# Patient Record
Sex: FEMALE | Race: WHITE
[De-identification: names, ages, dates, MRNs, and addresses within clinical notes are randomized per-mention and may not be internally consistent; named-entity substitution may affect disease eponyms.]

---

## 2017-01-18 ENCOUNTER — HOSPITAL ENCOUNTER (EMERGENCY)
Age: 67
Discharge: HOME | End: 2017-01-18
Payer: MEDICARE

## 2017-01-18 DIAGNOSIS — Z87.19: ICD-10-CM

## 2017-01-18 DIAGNOSIS — N18.9: ICD-10-CM

## 2017-01-18 DIAGNOSIS — D64.9: Primary | ICD-10-CM

## 2017-01-18 DIAGNOSIS — Z79.4: ICD-10-CM

## 2017-01-18 DIAGNOSIS — E78.00: ICD-10-CM

## 2017-01-18 DIAGNOSIS — E11.22: ICD-10-CM

## 2017-01-18 DIAGNOSIS — Z99.2: ICD-10-CM

## 2017-01-18 DIAGNOSIS — K92.2: ICD-10-CM

## 2017-01-18 PROCEDURE — 86900 BLOOD TYPING SEROLOGIC ABO: CPT

## 2017-01-18 PROCEDURE — 36415 COLL VENOUS BLD VENIPUNCTURE: CPT

## 2017-01-18 PROCEDURE — 96374 THER/PROPH/DIAG INJ IV PUSH: CPT

## 2017-01-18 PROCEDURE — 86901 BLOOD TYPING SEROLOGIC RH(D): CPT

## 2017-01-18 PROCEDURE — 99284 EMERGENCY DEPT VISIT MOD MDM: CPT

## 2017-01-18 PROCEDURE — 80048 BASIC METABOLIC PNL TOTAL CA: CPT

## 2017-01-18 PROCEDURE — 99283 EMERGENCY DEPT VISIT LOW MDM: CPT

## 2017-01-18 PROCEDURE — 85025 COMPLETE CBC W/AUTO DIFF WBC: CPT

## 2017-01-18 PROCEDURE — 85730 THROMBOPLASTIN TIME PARTIAL: CPT

## 2017-01-18 PROCEDURE — 86850 RBC ANTIBODY SCREEN: CPT

## 2017-01-18 PROCEDURE — 85610 PROTHROMBIN TIME: CPT

## 2017-01-18 PROCEDURE — 36430 TRANSFUSION BLD/BLD COMPNT: CPT

## 2017-01-18 PROCEDURE — 86920 COMPATIBILITY TEST SPIN: CPT

## 2017-01-24 ENCOUNTER — HOSPITAL ENCOUNTER (EMERGENCY)
Age: 67
Discharge: HOME | End: 2017-01-24
Payer: MEDICARE

## 2017-01-24 DIAGNOSIS — E11.22: ICD-10-CM

## 2017-01-24 DIAGNOSIS — Z99.2: ICD-10-CM

## 2017-01-24 DIAGNOSIS — J18.9: Primary | ICD-10-CM

## 2017-01-24 DIAGNOSIS — Z79.4: ICD-10-CM

## 2017-01-24 DIAGNOSIS — N18.6: ICD-10-CM

## 2017-01-24 DIAGNOSIS — R07.89: ICD-10-CM

## 2017-01-24 PROCEDURE — 93010 ELECTROCARDIOGRAM REPORT: CPT

## 2017-01-24 PROCEDURE — 80048 BASIC METABOLIC PNL TOTAL CA: CPT

## 2017-01-24 PROCEDURE — 71250 CT THORAX DX C-: CPT

## 2017-01-24 PROCEDURE — 99283 EMERGENCY DEPT VISIT LOW MDM: CPT

## 2017-01-24 PROCEDURE — 85025 COMPLETE CBC W/AUTO DIFF WBC: CPT

## 2017-01-24 PROCEDURE — 93005 ELECTROCARDIOGRAM TRACING: CPT

## 2017-01-24 PROCEDURE — 71020: CPT

## 2017-01-24 PROCEDURE — 99284 EMERGENCY DEPT VISIT MOD MDM: CPT

## 2017-01-24 PROCEDURE — 36415 COLL VENOUS BLD VENIPUNCTURE: CPT

## 2017-04-10 ENCOUNTER — HOSPITAL ENCOUNTER (OUTPATIENT)
Age: 67
Discharge: HOME | End: 2017-04-10
Payer: MEDICARE

## 2017-04-10 DIAGNOSIS — R78.81: Primary | ICD-10-CM

## 2017-04-15 ENCOUNTER — HOSPITAL ENCOUNTER (OUTPATIENT)
Age: 67
Discharge: ANOTHER HOSPITAL | End: 2017-04-15
Payer: MEDICARE

## 2017-04-15 ENCOUNTER — HOSPITAL ENCOUNTER (EMERGENCY)
Dept: HOSPITAL 76 - ED | Age: 67
Discharge: HOME | End: 2017-04-15
Payer: MEDICARE

## 2017-04-15 VITALS — SYSTOLIC BLOOD PRESSURE: 159 MMHG | DIASTOLIC BLOOD PRESSURE: 79 MMHG

## 2017-04-15 DIAGNOSIS — R53.1: Primary | ICD-10-CM

## 2017-04-15 DIAGNOSIS — J81.0: ICD-10-CM

## 2017-04-15 DIAGNOSIS — E87.5: Primary | ICD-10-CM

## 2017-04-15 DIAGNOSIS — E11.9: ICD-10-CM

## 2017-04-15 DIAGNOSIS — R53.83: ICD-10-CM

## 2017-04-15 DIAGNOSIS — E78.00: ICD-10-CM

## 2017-04-15 LAB
ALBUMIN/GLOB SERPL: 1.5 {RATIO} (ref 1–2.2)
ANION GAP SERPL CALCULATED.4IONS-SCNC: 16 MMOL/L (ref 6–13)
BASOPHILS NFR BLD AUTO: 0.1 10^3/UL (ref 0–0.1)
BASOPHILS NFR BLD AUTO: 0.8 %
BILIRUB BLD-MCNC: 0.6 MG/DL (ref 0.2–1)
BUN SERPL-MCNC: 84 MG/DL (ref 6–20)
CALCIUM UR-MCNC: 7.5 MG/DL (ref 8.5–10.3)
CHLORIDE SERPL-SCNC: 90 MMOL/L (ref 101–111)
CO2 SERPL-SCNC: 24 MMOL/L (ref 21–32)
CREAT SERPLBLD-SCNC: 9.6 MG/DL (ref 0.4–1)
EOSINOPHIL # BLD AUTO: 0.1 10^3/UL (ref 0–0.7)
EOSINOPHIL NFR BLD AUTO: 0.7 %
ERYTHROCYTE [DISTWIDTH] IN BLOOD BY AUTOMATED COUNT: 16 % (ref 12–15)
GFRSERPLBLD MDRD-ARVRAT: 4 ML/MIN/{1.73_M2} (ref 89–?)
GLOBULIN SER-MCNC: 3 G/DL (ref 2.1–4.2)
GLUCOSE SERPL-MCNC: 227 MG/DL (ref 70–100)
HCT VFR BLD AUTO: 26.3 % (ref 37–47)
HGB UR QL STRIP: 8.9 G/DL (ref 12–16)
LIPASE SERPL-CCNC: 57 U/L (ref 22–51)
LYMPHOCYTES # SPEC AUTO: 1.2 10^3/UL (ref 1.5–3.5)
LYMPHOCYTES NFR BLD AUTO: 13.2 %
MAGNESIUM SERPL-MCNC: 3 MG/DL (ref 1.7–2.8)
MCH RBC QN AUTO: 29.3 PG (ref 27–31)
MCHC RBC AUTO-ENTMCNC: 34 G/DL (ref 32–36)
MCV RBC AUTO: 86.1 FL (ref 81–99)
MONOCYTES # BLD AUTO: 0.6 10^3/UL (ref 0–1)
MONOCYTES NFR BLD AUTO: 6.9 %
NEUTROPHILS # BLD AUTO: 7.3 10^3/UL (ref 1.5–6.6)
NEUTROPHILS # SNV AUTO: 9.4 X10^3/UL (ref 4.8–10.8)
NEUTROPHILS NFR BLD AUTO: 78.4 %
NRBC # BLD AUTO: 0.2 /100WBC
PDW BLD AUTO: 7.3 FL (ref 7.9–10.8)
PHOSPHATE BLD-MCNC: 4.6 MG/DL (ref 2.5–4.6)
POTASSIUM SERPL-SCNC: 6.1 MMOL/L (ref 3.5–5)
PROT SPEC-MCNC: 7.4 G/DL (ref 6.7–8.2)
RBC MAR: 3.05 10^6/UL (ref 4.2–5.4)
SODIUM SERPLBLD-SCNC: 130 MMOL/L (ref 135–145)
WBC # BLD: 9.4 X10^3/UL

## 2017-04-15 PROCEDURE — 80053 COMPREHEN METABOLIC PANEL: CPT

## 2017-04-15 PROCEDURE — 71010: CPT

## 2017-04-15 PROCEDURE — 83735 ASSAY OF MAGNESIUM: CPT

## 2017-04-15 PROCEDURE — 84100 ASSAY OF PHOSPHORUS: CPT

## 2017-04-15 PROCEDURE — 83690 ASSAY OF LIPASE: CPT

## 2017-04-15 PROCEDURE — 36415 COLL VENOUS BLD VENIPUNCTURE: CPT

## 2017-04-15 PROCEDURE — 99284 EMERGENCY DEPT VISIT MOD MDM: CPT

## 2017-04-15 PROCEDURE — 93010 ELECTROCARDIOGRAM REPORT: CPT

## 2017-04-15 PROCEDURE — 85025 COMPLETE CBC W/AUTO DIFF WBC: CPT

## 2017-04-15 PROCEDURE — 84484 ASSAY OF TROPONIN QUANT: CPT

## 2017-04-15 PROCEDURE — 93005 ELECTROCARDIOGRAM TRACING: CPT

## 2017-04-15 RX ADMIN — INSULIN HUMAN STA UNIT: 100 INJECTION, SOLUTION PARENTERAL at 18:32

## 2017-04-15 RX ADMIN — SODIUM CHLORIDE ONE: 9 INJECTION, SOLUTION INTRAVENOUS at 17:40

## 2017-04-15 RX ADMIN — SODIUM POLYSTYRENE SULFONATE STA GM: 15 SUSPENSION ORAL; RECTAL at 20:04

## 2017-04-15 NOTE — XRAY PRELIMINARY REPORT
Accession: [id number]

Exam: XR Chest 1 View

 

IMPRESSION: New cardiomegaly and pulmonary vascular congestion, suggesting CHF.

 

[facility]

## 2017-04-15 NOTE — XRAY REPORT
EXAM: 

CHEST RADIOGRAPHY

 

EXAM DATE: 4/15/2017 07:30 PM.

 

CLINICAL HISTORY: Dyspnea and weakness.

 

COMPARISON: CT chest and chest radiograph 01/24/2017.

 

TECHNIQUE: 1 view.

 

FINDINGS:

Lungs/Pleura: New diffuse vascular engorgement. No pneumothorax or convincing pleural effusion.

 

Mediastinum: New cardiomegaly.

 

Other: Degenerative changes within the spine.

 

IMPRESSION: New cardiomegaly and pulmonary vascular congestion, suggesting CHF.

 

[facility]

Referring Provider Line: [phone]

## 2017-04-15 NOTE — ED PHYSICIAN DOCUMENTATION
History of Present Illness





- Stated complaint


Stated Complaint: weakness





- Chief complaint


Chief Complaint: Neuro





- History obtained from


History obtained from: Patient





- History of Present Illness


Timing: Today


Pain level max: 0


Pain level now: 0


Improved by: rest


Worsened by: movement





- Additonal information


Additional information: 


Patient is a 66-year-old female who presents to the emergency department after 

feeling weak all day today.  States has become progressively weak throughout 

the day.  States last time this happened her potassium was high.  She is a 

hemodialysis patient, on dialysis at home.  Sees Dr. Noriega for nephrology.  

Denies any recent illnesses or injury. She states it is been 2 days since her 

last dialysis





Review of Systems


Ten Systems: 10 systems reviewed and negative


Constitutional: denies: Fever, Chills


Nose: denies: Rhinorrhea / runny nose, Congestion


Throat: denies: Sore throat


Respiratory: denies: Cough


GI: denies: Nausea, Vomiting, Diarrhea


Skin: denies: Rash


Musculoskeletal: denies: Neck pain, Back pain


Neurologic: denies: Focal weakness, Numbness, Confused, Altered mental status, 

Headache





PD PAST MEDICAL HISTORY





- Past Medical History


Past Medical History: Yes


Cardiovascular: High cholesterol


Endocrine/Autoimmune: Type 2 diabetes


Psych: Depression, Anxiety


Other Past Medical History: Abd'l Hernia





- Past Surgical History


Past Surgical History: Yes





- Present Medications


Home Medications: 


 Ambulatory Orders











 Medication  Instructions  Recorded  Confirmed


 


Allopurinol 100 mg PO DAILY 04/12/15 04/15/17


 


Temazepam [Restoril] 7.5 mg PO DAILY PRN 04/12/15 04/15/17


 


buPROPion [Wellbutrin Xl] 300 mg PO DAILY 04/12/15 04/15/17


 


predniSONE [Deltasone] 2.5 mg PO BID 04/12/15 04/15/17


 


Citalopram Hydrobromide 40 mg PO DAILY 12/04/15 04/15/17





[Citalopram HBr]   


 


Furosemide 160 mg PO BID 12/04/15 04/15/17


 


Sevelamer Carbonate [Renvela] 3 tab PO TID 12/04/15 04/15/17


 


Calcitriol [Rocaltrol] 0.5 mcg PO DAILY 08/16/16 04/15/17


 


Calcium Carbonate 1,500 mg PO DAILY 08/16/16 04/15/17


 


Cinacalcet HCl [Sensipar] 60 mg PO DAILY 08/16/16 04/15/17


 


Levothyroxine Sodium [Unithroid] 150 mcg PO DAILY 08/16/16 04/15/17


 


Omeprazole 40 mg PO DAILY 08/16/16 04/15/17


 


Rosuvastatin Calcium [Crestor] 20 mg PO DAILY 08/16/16 04/15/17


 


Solifenacin Succinate [Vesicare] 5 mg PO DAILY 08/16/16 04/15/17


 


Azithromycin [Zithromax] 250 mg PO DAILY #6 tablet 01/24/17 04/15/17


 


Heparin Sodium,Porcine/Pf [Heparin 1,600 units IV DAILY 01/24/17 04/15/17





1,000 Unit/10 (100/ml)]   














- Allergies


Allergies/Adverse Reactions: 


 Allergies











Allergy/AdvReac Type Severity Reaction Status Date / Time


 


promethazine HCl * AdvReac  Hallucinati Verified 04/15/17 17:14





[From Phenergan]   ons  














- Social History


Does the pt smoke?: No


Smoking Status: Never smoker


Does the pt drink ETOH?: Yes


ETOH Use: Liquor


Does the pt have substance abuse?: No





- Immunizations


Immunizations are current?: Yes





- POLST


Patient has POLST: No





PD ED PE NORMAL





- Vitals


Vital signs reviewed: Yes





- General


General: Alert and oriented X 3, No acute distress, Well developed/nourished





- HEENT


HEENT: Moist mucous membranes





- Neck


Neck: Supple, no meningeal sign





- Cardiac


Cardiac: RRR, Strong equal pulses





- Respiratory


Respiratory: No respiratory distress, Clear bilaterally





- Abdomen


Abdomen: Soft, Non tender, Non distended





- Derm


Derm: Warm and dry, No rash





- Extremities


Extremities: Other (LUE fistula with palpable thrill)





- Neuro


Neuro: Alert and oriented X 3





- Psych


Psych: Normal mood, Normal affect





Results





- Vitals


Vitals: 


 Vital Signs - 24 hr











  04/15/17 04/15/17 04/15/17





  17:02 17:40 18:03


 


Temperature 37.1 C  


 


Heart Rate 96 96 92


 


Respiratory 19 20 19





Rate   


 


Blood Pressure 156/87 H 149/85 H 149/85 H


 


O2 Saturation 98 100 99














  04/15/17





  19:54


 


Temperature 


 


Heart Rate 92


 


Respiratory 12





Rate 


 


Blood Pressure 159/79 H


 


O2 Saturation 100








 Oxygen











O2 Source                      Room air

















- EKG (time done)


  ** 1811


Rate: Rate (enter#) (90)


Rhythm: NSR


Axis: Normal


Intervals: Prolonged DE


QRS: Normal


Ischemia: Other (minimal ST elevation II, aVF)





- Labs


Labs: 


 Laboratory Tests











  04/15/17 04/15/17 04/15/17





  17:28 17:28 17:28


 


WBC  9.4  


 


RBC  3.05 L  


 


Hgb  8.9 L  


 


Hct  26.3 L  


 


MCV  86.1  


 


MCH  29.3  


 


MCHC  34.0  


 


RDW  16.0 H  


 


Plt Count  304  


 


MPV  7.3 L  


 


Neut #  7.3 H  


 


Lymph #  1.2 L  


 


Mono #  0.6  


 


Eos #  0.1  


 


Baso #  0.1  


 


Absolute Nucleated RBC  0.02  


 


Nucleated RBCs  0.2  


 


Sodium   130 L 


 


Potassium   6.1 H* 


 


Chloride   90 L 


 


Carbon Dioxide   24 


 


Anion Gap   16.0 H 


 


BUN   84 H* 


 


Creatinine   9.6 H* 


 


Estimated GFR (MDRD)   4 L 


 


Glucose   227 H 


 


Calcium   7.5 L 


 


Phosphorus   4.6 


 


Magnesium   3.0 H 


 


Total Bilirubin   0.6 


 


AST   30 


 


ALT   27 


 


Alkaline Phosphatase   63 


 


Troponin I    0.05


 


Total Protein   7.4 


 


Albumin   4.4 


 


Globulin   3.0 


 


Albumin/Globulin Ratio   1.5 


 


Lipase   57 H 














- Rads (name of study)


  ** cxr


Radiology: Prelim report reviewed, EMP read contemporaneously, See rad report (

New cardiomegaly and pulmonary vascular congestion, suggesting CHF)





PD MEDICAL DECISION MAKING





- ED course


Complexity details: reviewed old records, reviewed results, re-evaluated patient

, considered differential, d/w patient, d/w consultant (Dr. Noriega - Recommends d/

c home, kayexalate 30gm PO, and dialysis when she returns home.)


ED course: 





Patient presents to the emergency department with a mild hyperkalemia.  No EKG 

changes.  Given insulin here as she does have high blood sugar.  She is able to 

do her dialysis at home tonight, will have her do this.  Discussed with Dr. Noriega

, who will follow up with her.  This should also help the fluid overload.  She 

is on Lasix at home and does still make urine.  She is otherwise asymptomatic 

here.  No hypoxia.  No respiratory distress.  No chest pain.  Patient counseled 

regarding signs and symptoms for which I believe and urgent re-evaluation would 

be necessary. Patient with good understanding of and agreement to plan and is 

comfortable going home at this time





This document was made in part using voice recognition software. While efforts 

are made to proofread this document, sound alike and grammatical errors may 

occur.





Departure





- Departure


Disposition: 01 Home, Self Care


Clinical Impression: 


 Hyperkalemia





Pulmonary edema


Qualifiers:


 Chronicity: acute Qualified Code(s): J81.0 - Acute pulmonary edema


Condition: Good


Instructions:  ED Potassium Excess


Follow-Up: 


Homer Borja MD [Primary Care Provider] - Within 3 Days


Comments: 


Return if you worsen.  I spoke with Dr. Hari mcclendon and he says you need to go 

home and do your dialysis.  Return here if you fail to improve. You should have 

your potassium rechecked in 3 days with your doctor


Discharge Date/Time: 04/15/17 20:32

## 2017-04-18 ENCOUNTER — HOSPITAL ENCOUNTER (OUTPATIENT)
Age: 67
End: 2017-04-18
Payer: MEDICARE

## 2017-04-18 DIAGNOSIS — R78.81: Primary | ICD-10-CM

## 2017-04-24 ENCOUNTER — HOSPITAL ENCOUNTER (OUTPATIENT)
Age: 67
Discharge: HOME | End: 2017-04-24
Payer: MEDICARE

## 2017-04-24 DIAGNOSIS — G47.33: Primary | ICD-10-CM

## 2017-04-24 PROCEDURE — 99203 OFFICE O/P NEW LOW 30 MIN: CPT

## 2017-05-08 ENCOUNTER — HOSPITAL ENCOUNTER (OUTPATIENT)
Age: 67
Discharge: HOME | End: 2017-05-08
Payer: MEDICARE

## 2017-05-08 DIAGNOSIS — R78.81: Primary | ICD-10-CM

## 2017-05-13 ENCOUNTER — HOSPITAL ENCOUNTER (OUTPATIENT)
Age: 67
Discharge: HOME | End: 2017-05-13
Payer: MEDICARE

## 2017-05-13 DIAGNOSIS — Z53.9: Primary | ICD-10-CM

## 2017-05-20 ENCOUNTER — HOSPITAL ENCOUNTER (OUTPATIENT)
Dept: HOSPITAL 76 - ED | Age: 67
Setting detail: OBSERVATION
Discharge: HOME | End: 2017-05-20
Attending: INTERNAL MEDICINE | Admitting: INTERNAL MEDICINE
Payer: MEDICARE

## 2017-05-20 VITALS — SYSTOLIC BLOOD PRESSURE: 165 MMHG | DIASTOLIC BLOOD PRESSURE: 70 MMHG

## 2017-05-20 DIAGNOSIS — D50.0: Primary | ICD-10-CM

## 2017-05-20 DIAGNOSIS — N18.6: ICD-10-CM

## 2017-05-20 DIAGNOSIS — E66.9: ICD-10-CM

## 2017-05-20 DIAGNOSIS — I12.0: ICD-10-CM

## 2017-05-20 DIAGNOSIS — Z79.52: ICD-10-CM

## 2017-05-20 DIAGNOSIS — T86.12: ICD-10-CM

## 2017-05-20 DIAGNOSIS — E11.22: ICD-10-CM

## 2017-05-20 DIAGNOSIS — Z99.2: ICD-10-CM

## 2017-05-20 DIAGNOSIS — E78.5: ICD-10-CM

## 2017-05-20 DIAGNOSIS — K92.2: ICD-10-CM

## 2017-05-20 DIAGNOSIS — Z79.899: ICD-10-CM

## 2017-05-20 LAB
ANION GAP SERPL CALCULATED.4IONS-SCNC: 17 MMOL/L (ref 6–13)
BASOPHILS NFR BLD AUTO: 0.1 10^3/UL (ref 0–0.1)
BASOPHILS NFR BLD AUTO: 0.9 %
BUN SERPL-MCNC: 83 MG/DL (ref 6–20)
CALCIUM UR-MCNC: 9.2 MG/DL (ref 8.5–10.3)
CHLORIDE SERPL-SCNC: 95 MMOL/L (ref 101–111)
CO2 SERPL-SCNC: 23 MMOL/L (ref 21–32)
CREAT SERPLBLD-SCNC: 10.4 MG/DL (ref 0.4–1)
EOSINOPHIL # BLD AUTO: 0.2 10^3/UL (ref 0–0.7)
EOSINOPHIL NFR BLD AUTO: 2.3 %
ERYTHROCYTE [DISTWIDTH] IN BLOOD BY AUTOMATED COUNT: 15.5 % (ref 12–15)
EST. AVERAGE GLUCOSE BLD GHB EST-MCNC: 103 MG/DL (ref 70–100)
GFRSERPLBLD MDRD-ARVRAT: 4 ML/MIN/{1.73_M2} (ref 89–?)
GLUCOSE SERPL-MCNC: 124 MG/DL (ref 70–100)
HBA1C BLD-MCNC: 0.31 G/DL
HCT VFR BLD AUTO: 23.4 % (ref 37–47)
HGB UR QL STRIP: 8.4 G/DL (ref 12–16)
LYMPHOCYTES # SPEC AUTO: 0.9 10^3/UL (ref 1.5–3.5)
LYMPHOCYTES NFR BLD AUTO: 9.2 %
MAGNESIUM SERPL-MCNC: 2.5 MG/DL (ref 1.7–2.8)
MCH RBC QN AUTO: 31 PG (ref 27–31)
MCHC RBC AUTO-ENTMCNC: 36.1 G/DL (ref 32–36)
MCV RBC AUTO: 85.8 FL (ref 81–99)
MONOCYTES # BLD AUTO: 1 10^3/UL (ref 0–1)
MONOCYTES NFR BLD AUTO: 10.6 %
NEUTROPHILS # BLD AUTO: 7.5 10^3/UL (ref 1.5–6.6)
NEUTROPHILS # SNV AUTO: 9.7 X10^3/UL (ref 4.8–10.8)
NEUTROPHILS NFR BLD AUTO: 77 %
NRBC # BLD AUTO: 0.3 /100WBC
PDW BLD AUTO: 7.8 FL (ref 7.9–10.8)
PHOSPHATE BLD-MCNC: 6.4 MG/DL (ref 2.5–4.6)
POTASSIUM SERPL-SCNC: 4 MMOL/L (ref 3.5–5)
RBC MAR: 2.73 10^6/UL (ref 4.2–5.4)
SODIUM SERPLBLD-SCNC: 135 MMOL/L (ref 135–145)
WBC # BLD: 9.7 X10^3/UL

## 2017-05-20 PROCEDURE — 86901 BLOOD TYPING SEROLOGIC RH(D): CPT

## 2017-05-20 PROCEDURE — 99283 EMERGENCY DEPT VISIT LOW MDM: CPT

## 2017-05-20 PROCEDURE — 36430 TRANSFUSION BLD/BLD COMPNT: CPT

## 2017-05-20 PROCEDURE — 80048 BASIC METABOLIC PNL TOTAL CA: CPT

## 2017-05-20 PROCEDURE — 36415 COLL VENOUS BLD VENIPUNCTURE: CPT

## 2017-05-20 PROCEDURE — 93005 ELECTROCARDIOGRAM TRACING: CPT

## 2017-05-20 PROCEDURE — 99284 EMERGENCY DEPT VISIT MOD MDM: CPT

## 2017-05-20 PROCEDURE — 82310 ASSAY OF CALCIUM: CPT

## 2017-05-20 PROCEDURE — 85014 HEMATOCRIT: CPT

## 2017-05-20 PROCEDURE — 84100 ASSAY OF PHOSPHORUS: CPT

## 2017-05-20 PROCEDURE — 86850 RBC ANTIBODY SCREEN: CPT

## 2017-05-20 PROCEDURE — 85018 HEMOGLOBIN: CPT

## 2017-05-20 PROCEDURE — 86900 BLOOD TYPING SEROLOGIC ABO: CPT

## 2017-05-20 PROCEDURE — 84484 ASSAY OF TROPONIN QUANT: CPT

## 2017-05-20 PROCEDURE — 83036 HEMOGLOBIN GLYCOSYLATED A1C: CPT

## 2017-05-20 PROCEDURE — 86920 COMPATIBILITY TEST SPIN: CPT

## 2017-05-20 PROCEDURE — 83735 ASSAY OF MAGNESIUM: CPT

## 2017-05-20 PROCEDURE — 85025 COMPLETE CBC W/AUTO DIFF WBC: CPT

## 2017-05-20 PROCEDURE — 93010 ELECTROCARDIOGRAM REPORT: CPT

## 2017-05-20 NOTE — ED PHYSICIAN DOCUMENTATION
History of Present Illness





- Stated complaint


Stated Complaint: WEAKNESS





- Chief complaint


Chief Complaint: General





- Additonal information


Additional information: 





hx from pt


66 f


ESRD


does home hemodialysis 5 d per week


her nephrologist is Dr Noriega


she has chronic GI bleeding - has been extensively worked up with EGD 

colonoscopy tagged red cell study, swallowed camera etc with no dx - beleieved 

to be slow seepage 2.2 ESRD


had a transfusion yesterday


feels generally weak today - no focal numbness or weakness


so her nephorlogist told her to come in to get checked - specifically lytes and 

HGB


denies fever chills


no HA CP AP


cough 


no NVD


stools dark could be 2/2 iron infusion


she states she is 3 kg up but does not feel she has pulm edema and declines CXR











Review of Systems


Constitutional: denies: Fever, Chills


Throat: denies: Sore throat


Cardiac: denies: Chest pain / pressure


Respiratory: reports: Cough.  denies: Dyspnea


GI: reports: Bloody / black stool (dark).  denies: Abdominal Pain, Nausea, 

Vomiting


: reports: Other (ESRD)


Neurologic: reports: Generalized weakness.  denies: Focal weakness, Numbness


Endocrine: denies: Easy bruising / bleeding


Immunocompromised: denies: Immunocompromised





PD PAST MEDICAL HISTORY





- Past Medical History


Past Medical History: Yes


Cardiovascular: High cholesterol


Endocrine/Autoimmune: Type 2 diabetes


Psych: Depression, Anxiety





- Past Surgical History


Past Surgical History: Yes





- Present Medications


Home Medications: 


 Ambulatory Orders











 Medication  Instructions  Recorded  Confirmed


 


Allopurinol 100 mg PO DAILY 04/12/15 05/19/17


 


Temazepam [Restoril] 7.5 mg PO DAILY PRN 04/12/15 05/19/17


 


buPROPion [Wellbutrin Xl] 300 mg PO DAILY 04/12/15 05/19/17


 


predniSONE [Deltasone] 2.5 mg PO BID 04/12/15 05/19/17


 


Citalopram Hydrobromide 40 mg PO DAILY 12/04/15 05/19/17





[Citalopram HBr]   


 


Furosemide 160 mg PO BID 12/04/15 05/19/17


 


Sevelamer Carbonate [Renvela] 3 tab PO TID 12/04/15 05/19/17


 


Calcitriol [Rocaltrol] 0.5 mcg PO DAILY 08/16/16 05/19/17


 


Calcium Carbonate 1,500 mg PO DAILY 08/16/16 05/19/17


 


Cinacalcet HCl [Sensipar] 60 mg PO DAILY 08/16/16 05/19/17


 


Levothyroxine Sodium [Unithroid] 150 mcg PO DAILY 08/16/16 05/19/17


 


Omeprazole 40 mg PO DAILY 08/16/16 05/19/17


 


Rosuvastatin Calcium [Crestor] 20 mg PO DAILY 08/16/16 05/19/17


 


Solifenacin Succinate [Vesicare] 5 mg PO DAILY 08/16/16 05/19/17


 


Azithromycin [Zithromax] 250 mg PO DAILY #6 tablet 01/24/17 05/19/17


 


Heparin Sodium,Porcine/Pf [Heparin 1,600 units IV DAILY 01/24/17 05/19/17





1,000 Unit/10 (100/ml)]   














- Allergies


Allergies/Adverse Reactions: 


 Allergies











Allergy/AdvReac Type Severity Reaction Status Date / Time


 


promethazine HCl * AdvReac  Hallucinati Verified 04/15/17 17:14





[From Phenergan]   ons  














- Social History


Does the pt smoke?: No


Smoking Status: Never smoker


Does the pt drink ETOH?: Yes


Does the pt have substance abuse?: No





- Immunizations


Immunizations are current?: Yes





- POLST


Patient has POLST: No





PD ED PE NORMAL





- Vitals


Vital signs reviewed: Yes





- HEENT


HEENT: Atraumatic





- Neck


Neck: Supple, no meningeal sign





- Cardiac


Cardiac: RRR.  No: No murmur (loud sys mur,urs - not new per pt, AS per chart)





- Respiratory


Respiratory: No respiratory distress, Clear bilaterally





- Abdomen


Abdomen: Soft, Non tender





- Rectal


Rectal: Other (dark strongly heme occult + stool QC passed)





- Derm


Derm: Normal color





- Extremities


Extremities: No deformity





- Neuro


Neuro: Alert and oriented X 3, CNs 2-12 intact, No motor deficit, No sensory 

deficit, Normal speech





Results





- Vitals


Vitals: 


 Vital Signs - 24 hr











  05/20/17 05/20/17 05/20/17





  08:08 09:38 10:37


 


Temperature 36.7 C  


 


Heart Rate 98 91 91


 


Respiratory 16  20





Rate   


 


Blood Pressure 164/88 H 158/88 H 144/70 H


 


O2 Saturation 95 96 95














  05/20/17





  11:25


 


Temperature 


 


Heart Rate 88


 


Respiratory 18





Rate 


 


Blood Pressure 163/77 H


 


O2 Saturation 96








 Oxygen











O2 Source                      Room air

















- EKG (time done)


  ** 0852


Rate: Rate (enter#) (92)


Rhythm: NSR


Axis: Normal


Intervals: Normal MI


QRS: Normal


Ischemia: Normal ST segments





- Labs


Labs: 


 Laboratory Tests











  05/20/17 05/20/17 05/20/17





  09:00 09:00 09:00


 


WBC  9.7  


 


RBC  2.73 L  


 


Hgb  8.4 L  


 


Hct  23.4 L  


 


MCV  85.8  


 


MCH  31.0  


 


MCHC  36.1 H  


 


RDW  15.5 H  


 


Plt Count  236  


 


MPV  7.8 L  


 


Neut #  7.5 H  


 


Lymph #  0.9 L  


 


Mono #  1.0  


 


Eos #  0.2  


 


Baso #  0.1  


 


Absolute Nucleated RBC  0.03  


 


Nucleated RBCs  0.3  


 


Sodium   135 


 


Potassium   4.0 


 


Chloride   95 L 


 


Carbon Dioxide   23 


 


Anion Gap   17.0 H 


 


BUN   83 H* 


 


Creatinine   10.4 H* 


 


Estimated GFR (MDRD)   4 L 


 


Glucose   124 H 


 


Calcium   9.2 


 


Phosphorus   6.4 H 


 


Magnesium   2.5 


 


Troponin I    0.07














PD MEDICAL DECISION MAKING





- ED course


ED course: 





discussed results with nephrology on call


he recommends pt need hgb > 9 and if still GI bleeding and < 9 transfer to [facility] 

for admit for transfusion where there is nephrology


pt however declines to be transferred to [facility] - she states she wants to be 

transfused here and then will do her own dialysis tonight at home


since will take many hours to slowly transfuse a dialysis pt while watching for 

fluid overload discussed with hospitalist Dr Zaragoza who agreed to place pt in 

obs 





Departure





- Departure


Disposition: ED Place in Observation


Clinical Impression: 


 Weakness, ESRD (end stage renal disease)





GI bleed


Qualifiers:


 GI bleed type/associated pathology: unspecified gastrointestinal hemorrhage 

type Qualified Code(s): K92.2 - Gastrointestinal hemorrhage, unspecified





Anemia


Qualifiers:


 Anemia type: unspecified type Qualified Code(s): D64.9 - Anemia, unspecified


Condition: Fair


Discharge Date/Time: 05/20/17 12:31

## 2017-05-20 NOTE — DISCHARGE PLAN
Discharge Plan


Disposition: 01 Home, Self Care


Condition: Fair


Diet: Diabetic


Activity Restrictions: Activity as Tolerated


Shower Restrictions: No


Driving Restrictions: No


Weight Bearing: Full Weight


Additional Instructions or Follow Up instructions: 


You presented to the hospital with dizziness and nausea. We found your Hb was 

only 8.4 despite you getting 2 units of blood yesterday. We placed you in 

observation to get 1 units of blood today. You received the blood and there 

were no complications so you are ready to go home. Please make sure to do 

dialysis today and I would recommend taking off a little extra fluid the next 

few days as you are a bit over your dry weight. Also please follow up for a lab 

draw on Monday to check your Hb. 


No Smoking: If you smoke, Please STOP!  Call [phone] for help.

## 2017-05-20 NOTE — HISTORY & PHYSICAL EXAMINATION
Chief Complaint





- Chief Complaint


Chief Complaint: Weak and dizzy





History of Present Illness





- Admitted From


Admitted From:: emergency department





- History Obtained From


Records Reviewed: yes


History obtained from: patient


Exam Limitations: none





- History of Present Illness


HPI Comment/Other: 





Patient is a 66-year-old female with a past medical history significant for end-

stage renal disease on home hemodialysis 5 days a week secondary to focal 

segmental glomerular nephritis diagnosed in  status post transplant in  

with failed transplant in 2013 back on dialysis, history of chronic GI bleed 

with extensive workup including EGD, colonoscopy, capsule endoscopy and RBC 

scan thought to likely be bleeding slowly from AVMs who receives blood 

transfusions off and on, diabetes, hypertension, hyperlipidemia and large 

abdominal hernia who presents to the emergency department with a chief 

complaint of dizziness and weakness. The patient was transfused 2 units of 

packed RBCs in the MAC clinic yesterday as she had a hemoglobin of 7.3 from 

last . She states that she returned home and did dialysis last night and 

when she woke up this morning she felt very dizzy and weak this is usually an 

indication to her that she needs further blood transfusion. The patient 

presents to the emergency department for a blood transfusion. Patient states 

that she has chronic shortness of breath that is not any worse than her normal. 

Patient also states she has chronic cough but is not any worse than her normal. 

She denies any chest pain. She states that her normal dry weight is 90.8 kg and 

today her weight is 93.8 kg.





On presentation to the emergency department the patient was afebrile and had 

stable vital signs her CBC revealed a hemoglobin of 8.4. The patient's 

nephrologist Dr. Benavides requested that the patient be transfused slowly. 

Initially plan was for the patient to be sent to [facility] to 

be transfused however the patient declined to be transferred. She requested 

that she get transfused 1 unit here and then she would return home to perform 

dialysis. She stated that if she needed further transfusion she would return to 

the emergency department. Patient was placed in observation on the medical lemon 

for 1 unit blood transfusion and then will be discharged home for dialysis.





Review of Systems





- Constitutional


Constitutional: reports: Fatigue, Weakness, Poor appetite, Weight gain.  denies

: Fever, Chills, Malaise, Diaphoresis, Night sweats





- Eyes


Eyes: denies: Pain, Irritation, Amaurosis, Blurred vision, Spots in vision, 

Field loss, Vision loss, Dipolpia, Corrective lenses, Other





- Ears, Nose & Throat


Ears, Nose & Throat: denies: Ear pain, Hearing loss, Hearing aids, Tinnitus, 

Vertigo, Nasal pain, Nasal discharge, Nosebleeds, Nasal obstruction, Nasal 

congestion, Postnasal drainage, Dentures, Sore throat, Hoarseness, Mouth lesions

, Bleeding gums, Dental decay, Dental pain, Other





- Cardiovascular


Cariovascular: denies: Irregular heart rate, Palpitations, Chest pain, Edema, 

Lightheadedness, Syncope, Exertional dyspnea, Decr. exercise tolerance, 

Orthopnea, Other





- Respiratory


Respiratory: reports: Cough (chronic), SOB with exertion (chronic).  denies: 

Sputum production, Wheezing, Hemoptysis, Orthopnea, Apnea, Pleuritic pain





- Gastrointestinal


Gastrointestinal: reports: Other (large abd hernia).  denies: Abdominal pain, 

Abdominal distention, Constipation, Diarrhea, Change in bowel habits, Rectal 

bleeding, Black stools, Bloody stools, Nausea, Vomiting, Bile emesis, Toi 

blood emesis, Coffee grounds emesis, Reflux/heartburn, Bloating, Poor appetite





- Genitourinary


Genitourinary: denies: Dysuria, Frequency, Urgency, Hematuria, Incontinence, 

Flank pain, Nocturia, Urethral discharge, Sexual dysfunction, Other





- Musculoskeletal


Musculoskeletal: denies: Muscle pain, Back pain, Muscle aches, Stiffness, 

Limited range of motion, Muscle weakness, Gout, Joint pain, Joint swelling, 

Other





- Integumentary


Integumentary: denies: Rash, Pruritis, Lesions, Dryness, Lumps, Acne, Pigment 

changes, Nail changes, Hair changes, Other





- Neurological


Neurological: reports: General weakness, Dizziness.  denies: Focal weakness, 

Headache, Numbness, Memory problems, Pre-existing deficit, Abnormal gait, 

Seizures, Incoordination, Slurred speech





- Psychiatric


Psychiatric: denies: Depression, Anxiety, Suicidal, Delusions, Hallucinations, 

Homicidal, Other





- Endocrine


Endocrine: denies: Polyuria, Polydypsia, Polyphagia, Intolerance to cold, 

Intolerance to heat, Other





- Hematologic/Lymphatic


Hematologic/Lymphatic: denies: Anemia, Bruising, Petechiae, Blood clots, 

Lymphadenopathy, Bleeding tendencies, Recurrent infections, Other





History





- Past Medical History


Cardiovascular: reports: Hypertension, High cholesterol


Endocrine/Autoimmune: reports: Type 2 diabetes


GI: reports: GI bleed (chronic), Other (Hernia)


: reports: Dialysis, Other (End Stage Renal Disease)


Psych: reports: Depression, Anxiety


MRSA Hx?: No





- Past Surgical History


Other past surgical history: Kidney trasplant 





- Family & Social History


Family History: Mother: Cancer (uterine), Father:  (Had macular 

degeneration but  of old age), Sister: Alive and Well, Brother: Diabetes, 

Type 2


Living arrangement: At home


Living Situation: With spouse/s.o.


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 

from stress. Lives in [place] with significant other, 2 daughters in [place]

, 4 grandkids.





- Substance History


Use: Uses substance without health or social issues: Alcohol (occasionally 

drinks a glass of wine or whiskey), Cannabis (occasionally)


Abuse: Recurrent use of substance despite neg consequences: NONE


Dependence: Experiences withdrawal or developed tolerances: NONE





- POLST


Patient has POLST: No


POLST Status: Full Code





Meds/Allgy





- Home Medications


Home Medications: 


 Ambulatory Orders











 Medication  Instructions  Recorded  Confirmed


 


Allopurinol 100 mg PO DAILY 04/12/15 05/19/17


 


Temazepam [Restoril] 7.5 mg PO DAILY PRN 04/12/15 05/19/17


 


buPROPion [Wellbutrin Xl] 300 mg PO DAILY 04/12/15 05/19/17


 


predniSONE [Deltasone] 2.5 mg PO BID 04/12/15 05/19/17


 


Citalopram Hydrobromide 40 mg PO DAILY 12/04/15 05/19/17





[Citalopram HBr]   


 


Furosemide 160 mg PO BID 12/04/15 05/19/17


 


Sevelamer Carbonate [Renvela] 3 tab PO TID 12/04/15 05/19/17


 


Calcitriol [Rocaltrol] 0.5 mcg PO DAILY 16


 


Calcium Carbonate 1,500 mg PO DAILY 16


 


Cinacalcet HCl [Sensipar] 60 mg PO DAILY 16


 


Levothyroxine Sodium [Unithroid] 150 mcg PO DAILY 16


 


Omeprazole 40 mg PO DAILY 16


 


Rosuvastatin Calcium [Crestor] 20 mg PO DAILY 16


 


Solifenacin Succinate [Vesicare] 5 mg PO DAILY 16


 


Azithromycin [Zithromax] 250 mg PO DAILY #6 tablet 17


 


Heparin Sodium,Porcine/Pf [Heparin 1,600 units IV DAILY 17





1,000 Unit/10 (100/ml)]   














- Allergies


Allergies/Adverse Reactions: 


 Allergies











Allergy/AdvReac Type Severity Reaction Status Date / Time


 


promethazine HCl * AdvReac  Hallucinati Verified 04/15/17 17:14





[From Phenergan]   ons  














Exam





- Vital Signs


Reviewed Vital Signs: Yes


Vital Signs: 





 Vital Signs x48h











  Temp Pulse Resp BP Pulse Ox


 


 17 11:25   88  18  163/77 H  96


 


 17 10:37   91  20  144/70 H  95


 


 17 09:38   91   158/88 H  96


 


 17 08:08  36.7 C  98  16  164/88 H  95














- Physical Exam


General Appearance: positive: No acute distress, Alert, Other (Obese)


Eyes Bilateral: positive: Normal inspection, PERRL, EOMI, No lid inflammation, 

Conjunctivae nml, No scleral icterus


ENT: positive: ENT inspection nml, Pharynx nml, No signs of dehydration.  

negative: Purulent nasal drainage, Pharyngeal erythema, Oral lesions


Neck: positive: Nml inspection, Thyroid nml, No JVD, Trachea midline.  negative

: Thyromegaly, Lymphadenopathy (R), Lymphadenopathy (L)


Respiratory: positive: Chest non-tender, No respiratory distress, Rales (bases)

.  negative: Wheezes, Rhonchi


Cardiovascular: positive: Regular rate & rhythm, No murmur, No gallop


Peripheral Pulses: positive: 2+


Abdomen: positive: Non-tender, No organomegaly, Nml bowel sounds, Other (Large 

hernia).  negative: Guarding, Rebound


Back: positive: Nml inspection.  negative: CVA tenderness (R), CVA tenderness (L

)


Skin: positive: Color nml, No rash, Warm, Pallor


Extremities: positive: Non-tender, Full ROM, Nml appearance, Pedal edema (mild)


Neurologic/Psychiatric: positive: Oriented x3, CN's nml (2-12), Motor nml, 

Sensation nml, Mood/affect nml





Conclusion/Plan





- Problem List


(1) Anemia


Conclusion/Plan: 


Secondary to chronic GI bleed of undetermined source likely from AVMs


Full work up done over years 


Treated with periodic blood transfusion 


Blood transfusion yesterday with 2 units but still symptomatic


Patient does not want to go to [facility] for blood transfusion





Plan:


Give 1 units PRBC 


Recheck Hb


Send home for home dialysis 


Qualifiers: 


   Anemia type: unspecified type   Qualified Code(s): D64.9 - Anemia, 

unspecified   





(2) ESRD (end stage renal disease)


Conclusion/Plan: 


Secondary FSGN dx is 2005


s/p renal tx in  failed in  back on dialysis


Home dialysis 5 times a week


2 kg above dry weight but does not have hypoxia or respiratory distress


Will do dialysis at home after transfusion








(3) Diabetes


Conclusion/Plan: 


On Novolin 70/30 at home


BS well controlled on presentation 


Will place on DM diet and SS insulin while here. 


Qualifiers: 


   Diabetes mellitus type: type 2 





- Lab Results


Lab results reviewed: Yes


Fish Bones: 


 17 09:00





 17 09:00





- Diagnostic Imaging Results


Diagnostic Imaging Results: positive: Final report reviewed





Issues/Core Measures





- Anticipated LOS


Anticipated Stay Length: Less than 2 midnights





- DVT/VTE - Prophylaxis


VTE/DVT Prophylaxis med ordered at admit?: Yes

## 2017-05-21 NOTE — DISCHARGE SUMMARY
DATE OF ADMISSION: 05/20/2017

 

DATE OF DISCHARGE: 05/20/2017

 

PRIMARY CARE PHYSICIAN: Homer Borja MD

 

NEPHROLOGIST: JESICA Noriega MD

 

DISCHARGING PHYSICIAN: Jere Zaragoza MD

 

DISCHARGE DIAGNOSES

1. Anemia.

2. End-stage renal disease.

3. Diabetes.

 

MEDICATIONS

1. Prednisone 2.5 mg p.o. b.i.d.

2. Wellbutrin- mg p.o. daily.

3. Restoril 7.5 mg p.o. daily p.r.n. for anxiety.

4. VESIcare 5 mg p.o. daily.

5. Renvela 2400 mg p.o. b.i.d.

6. Crestor 20 mg p.o. daily.

7. Omeprazole 40 mg p.o. daily.

8. Synthroid 150 mcg p.o. daily.

9. Lasix 160 mg p.o. b.i.d.

10. Citalopram 40 mg p.o. daily.

11. Cinacalcet 60 mg p.o. daily.

12. Calcium carbonate 1500 mg p.o. daily.

13. Calcitriol 0.5 mcg p.o. daily.

14. Azithromycin 250 mg p.o. daily.

15. Allopurinol 100 mg p.o. daily.

 

HOSPITAL COURSE: The patient is a very pleasant 66-year-old female with a past 
medical history significant for end-stage renal disease on home dialysis 5 
times a week secondary to focal segmental glomerular nephritis diagnosed in 2005
, status post transplant in 2007, with failed transplant in 2013, back on 
dialysis, history of chronic GI bleed with extensive workup including EGD, 
colonoscopy, capsule endoscopy and RBC scan without obvious source, thought to 
likely be bleeding slowly from AVMs, who receives blood transfusions off and on
, diabetes, hypertension, hyperlipidemia and large abdominal hernia, who 
presented to the emergency department with a chief complaint of dizziness and 
weakness. The patient had just received 2 units of packed RBCs in the MAC 
clinic the day prior to presentation. She had a hemoglobin of 7.3 one week 
prior to the transfusion, but the hemoglobin was only reported to her just the 
day prior to presentation to the hospital. The patient states that after she 
returned home from the transfusion she did perform dialysis at home. However, 
when she woke up in the morning, she felt very dizzy, felt weak and was 
nauseous. She stated that this usually indicates that she needs further blood 
transfusions, so she came in to the emergency department. The patient on 
presentation to the emergency department was found to have a hemoglobin of 8.4 
and at the request of Dr. Noriega, the patient was placed in observation for an 
additional unit of packed RBCs. The patient received 1 unit of packed RBCs in 
observation. She declined to have her hemoglobin drawn. She did get a lab 
request form for hemoglobin to be drawn on 05/22/2017. The patient was 
discharged home, will perform dialysis today. She was asked to take off extra 
fluid for the next several days of dialysis as she was above her dry weight by 
about 3 kg on presentation to the hospital. The patient was advised to follow 
up with her primary care physician and her nephrologist. The results of the 
hemoglobin draw on 05/20/2017 will be sent to Dr. Borja and Dr. Noriega. The 
patient was discharged in stable condition.

 

PHYSICAL EXAMINATION AT DISCHARGE

VITAL SIGNS: Temperature 37.1, heart rate 99, blood pressure 151/71, 
respiratory rate 18, O2 saturation 97% on room air.

GENERAL: The patient is obese. She is pleasant, alert, and able to answer all 
my questions appropriately.

HEENT: Pupils are equal and reactive to light. Extraocular muscles are intact. 
Mucous membranes are moist. There is no conjunctival pallor or scleral icterus 
noted.

NECK: Supple. No thyromegaly. No JVD. Trachea is midline.

LYMPH NODES: There is no cervical or axillary lymphadenopathy noted.

CARDIOVASCULAR: S1, S2, regular rate and rhythm. No murmurs, rubs, or gallops.

LUNGS: Clear to auscultation bilaterally. No wheezes, rhonchi, or crackles.

ABDOMEN: Obese. There is a large hernia that is protruding from the patient's 
abdomen. Otherwise bowel sounds are present, and there is no rebound or 
guarding.

EXTREMITIES: There is some mild lower extremity edema. Peripheral pulses are 
palpable. There is no cyanosis or clubbing.

SKIN: No skin rash, lesions, cellulitis, or abscesses.

MUSCULOSKELETAL: The patient has good range of motion. No joint tenderness. No 
joint effusions. NEUROLOGIC: The patient is alert, oriented x3. Cranial nerves 2
-12 are grossly intact. Strength is grossly normal.

 

LABORATORY: WBC 9.7, hemoglobin 8.4, hematocrit 23.4, platelet count 236. 
Sodium 135, potassium 4.0, chloride 95, carbon dioxide 23, BUN 83, creatinine 
10.4, glucose 124. Glycosylated hemoglobin 5.2, calcium 9.2, phosphorus 6.4, 
magnesium 2.5, troponin 0.07.

 

IMAGING: EKG impression: Sinus rhythm, no ST elevations or ischemic changes.

 

FOLLOWUP/RECOMMENDATIONS: The patient was placed in observation for 1 unit of 
blood transfusion. After transfusion, she was discharged home. She will perform 
dialysis at home. She will return on 05/22/2017 to have her blood drawn to 
check another hemoglobin. These results will be sent to Dr. oBrja and Dr. Noriega. The patient will follow up with her nephrologist and her PCP, and will 
continue on home dialysis.

 

Greater than 30 minutes were spent on discharge.

 

 

 

DD:05/20/2017 18:19:00  DT: 05/21/2017 08:33  JOB #: [id number]  EXT JOB #:[id number]

MTDD

## 2017-05-22 ENCOUNTER — HOSPITAL ENCOUNTER (OUTPATIENT)
Dept: HOSPITAL 76 - LAB.N | Age: 67
Discharge: HOME | End: 2017-05-22
Attending: INTERNAL MEDICINE
Payer: MEDICARE

## 2017-05-22 DIAGNOSIS — D64.9: Primary | ICD-10-CM

## 2017-05-22 LAB
BASOPHILS NFR BLD AUTO: 0.1 10^3/UL (ref 0–0.1)
BASOPHILS NFR BLD AUTO: 1.1 %
EOSINOPHIL # BLD AUTO: 0.3 10^3/UL (ref 0–0.7)
EOSINOPHIL NFR BLD AUTO: 3 %
ERYTHROCYTE [DISTWIDTH] IN BLOOD BY AUTOMATED COUNT: 15.6 % (ref 12–15)
HCT VFR BLD AUTO: 31.6 % (ref 37–47)
HGB UR QL STRIP: 11.3 G/DL (ref 12–16)
LYMPHOCYTES # SPEC AUTO: 1.4 10^3/UL (ref 1.5–3.5)
LYMPHOCYTES NFR BLD AUTO: 16.9 %
MCH RBC QN AUTO: 31.4 PG (ref 27–31)
MCHC RBC AUTO-ENTMCNC: 35.8 G/DL (ref 32–36)
MCV RBC AUTO: 87.8 FL (ref 81–99)
MONOCYTES # BLD AUTO: 1 10^3/UL (ref 0–1)
MONOCYTES NFR BLD AUTO: 12.3 %
NEUTROPHILS # BLD AUTO: 5.7 10^3/UL (ref 1.5–6.6)
NEUTROPHILS # SNV AUTO: 8.5 X10^3/UL (ref 4.8–10.8)
NEUTROPHILS NFR BLD AUTO: 66.7 %
NRBC # BLD AUTO: 0.7 /100WBC
PDW BLD AUTO: 8.2 FL (ref 7.9–10.8)
RBC MAR: 3.6 10^6/UL (ref 4.2–5.4)
WBC # BLD: 8.5 X10^3/UL

## 2017-05-22 PROCEDURE — 85025 COMPLETE CBC W/AUTO DIFF WBC: CPT

## 2017-05-22 PROCEDURE — 36415 COLL VENOUS BLD VENIPUNCTURE: CPT

## 2017-06-26 ENCOUNTER — HOSPITAL ENCOUNTER (OUTPATIENT)
Dept: HOSPITAL 76 - SC | Age: 67
Discharge: HOME | End: 2017-06-26
Attending: INTERNAL MEDICINE
Payer: MEDICARE

## 2017-06-26 DIAGNOSIS — G47.33: Primary | ICD-10-CM

## 2017-06-26 PROCEDURE — 95810 POLYSOM 6/> YRS 4/> PARAM: CPT

## 2017-07-07 ENCOUNTER — HOSPITAL ENCOUNTER (EMERGENCY)
Dept: HOSPITAL 76 - ED | Age: 67
Discharge: HOME | End: 2017-07-07
Payer: MEDICARE

## 2017-07-07 VITALS — DIASTOLIC BLOOD PRESSURE: 59 MMHG | SYSTOLIC BLOOD PRESSURE: 134 MMHG

## 2017-07-07 DIAGNOSIS — D50.0: ICD-10-CM

## 2017-07-07 DIAGNOSIS — E11.22: ICD-10-CM

## 2017-07-07 DIAGNOSIS — N18.6: ICD-10-CM

## 2017-07-07 DIAGNOSIS — Z79.52: ICD-10-CM

## 2017-07-07 DIAGNOSIS — F32.9: ICD-10-CM

## 2017-07-07 DIAGNOSIS — E78.00: ICD-10-CM

## 2017-07-07 DIAGNOSIS — Z99.2: ICD-10-CM

## 2017-07-07 DIAGNOSIS — K21.9: ICD-10-CM

## 2017-07-07 DIAGNOSIS — R03.0: ICD-10-CM

## 2017-07-07 DIAGNOSIS — K92.2: Primary | ICD-10-CM

## 2017-07-07 DIAGNOSIS — F41.9: ICD-10-CM

## 2017-07-07 LAB
BASOPHILS NFR BLD AUTO: 0.1 10^3/UL (ref 0–0.1)
BASOPHILS NFR BLD AUTO: 1.2 %
EOSINOPHIL # BLD AUTO: 0.3 10^3/UL (ref 0–0.7)
EOSINOPHIL NFR BLD AUTO: 3.8 %
ERYTHROCYTE [DISTWIDTH] IN BLOOD BY AUTOMATED COUNT: 17 % (ref 12–15)
HCT VFR BLD AUTO: 19.7 % (ref 37–47)
HGB UR QL STRIP: 6.8 G/DL (ref 12–16)
LYMPHOCYTES # SPEC AUTO: 1.1 10^3/UL (ref 1.5–3.5)
LYMPHOCYTES NFR BLD AUTO: 15.6 %
MCH RBC QN AUTO: 31.7 PG (ref 27–31)
MCHC RBC AUTO-ENTMCNC: 34.8 G/DL (ref 32–36)
MCV RBC AUTO: 91.2 FL (ref 81–99)
MONOCYTES # BLD AUTO: 0.6 10^3/UL (ref 0–1)
MONOCYTES NFR BLD AUTO: 8.4 %
NEUTROPHILS # BLD AUTO: 5.1 10^3/UL (ref 1.5–6.6)
NEUTROPHILS # SNV AUTO: 7.2 X10^3/UL (ref 4.8–10.8)
NEUTROPHILS NFR BLD AUTO: 71 %
NRBC # BLD AUTO: 0 /100WBC
PDW BLD AUTO: 7.8 FL (ref 7.9–10.8)
RBC MAR: 2.16 10^6/UL (ref 4.2–5.4)
WBC # BLD: 7.2 X10^3/UL

## 2017-07-07 PROCEDURE — 86920 COMPATIBILITY TEST SPIN: CPT

## 2017-07-07 PROCEDURE — 86901 BLOOD TYPING SEROLOGIC RH(D): CPT

## 2017-07-07 PROCEDURE — 85025 COMPLETE CBC W/AUTO DIFF WBC: CPT

## 2017-07-07 PROCEDURE — 36430 TRANSFUSION BLD/BLD COMPNT: CPT

## 2017-07-07 PROCEDURE — 86850 RBC ANTIBODY SCREEN: CPT

## 2017-07-07 PROCEDURE — 86900 BLOOD TYPING SEROLOGIC ABO: CPT

## 2017-07-07 PROCEDURE — 36415 COLL VENOUS BLD VENIPUNCTURE: CPT

## 2017-07-07 PROCEDURE — 99283 EMERGENCY DEPT VISIT LOW MDM: CPT

## 2017-07-07 PROCEDURE — 99284 EMERGENCY DEPT VISIT MOD MDM: CPT

## 2017-07-07 NOTE — ED PHYSICIAN DOCUMENTATION
PD HPI ABD PAIN





- Stated complaint


Stated Complaint: WEAKNESS/SOA





- Chief complaint


Chief Complaint: Abd Pain





- History obtained from


History obtained from: Patient





- Additional information


Additional information: 





66-year-old woman on home hemodialysis, she is very self-sufficient.  She has 

an occult GI bleed of unclear source despite PillCam, upper endoscopy, tagged 

red blood cell scan, and needs transfusing every month or 2.  She feels weak 

generally and complains of very mild abdominal pain.  She has dark and tarry 

stools.  None of this is out of the ordinary for her.  She presents for blood 

transfusion.





Review of Systems


Constitutional: denies: Fever, Chills


Cardiac: denies: Chest pain / pressure, Palpitations


Respiratory: denies: Dyspnea, Cough


GI: denies: Nausea, Vomiting





PD PAST MEDICAL HISTORY





- Past Medical History


Cardiovascular: High cholesterol


Endocrine/Autoimmune: Type 2 diabetes


GI: GI bleed, Other


: Dialysis, Other


Psych: Depression, Anxiety





- Past Surgical History


Past Surgical History: Yes





- Present Medications


Home Medications: 


 Ambulatory Orders











 Medication  Instructions  Recorded  Confirmed


 


Allopurinol 100 mg PO DAILY 04/12/15 05/19/17


 


Temazepam [Restoril] 7.5 mg PO DAILY PRN 04/12/15 05/19/17


 


buPROPion [Wellbutrin Xl] 300 mg PO DAILY 04/12/15 05/19/17


 


predniSONE [Deltasone] 2.5 mg PO BID 04/12/15 05/19/17


 


Citalopram Hydrobromide 40 mg PO DAILY 12/04/15 05/19/17





[Citalopram HBr]   


 


Furosemide 160 mg PO BID 12/04/15 05/19/17


 


Sevelamer Carbonate [Renvela] 3 tab PO TID 12/04/15 05/19/17


 


Calcitriol [Rocaltrol] 0.5 mcg PO DAILY 08/16/16 05/19/17


 


Calcium Carbonate 1,500 mg PO DAILY 08/16/16 05/19/17


 


Cinacalcet HCl [Sensipar] 60 mg PO DAILY 08/16/16 05/19/17


 


Levothyroxine Sodium [Unithroid] 150 mcg PO DAILY 08/16/16 05/19/17


 


Omeprazole 40 mg PO DAILY 08/16/16 05/19/17


 


Rosuvastatin Calcium [Crestor] 20 mg PO DAILY 08/16/16 05/19/17


 


Solifenacin Succinate [Vesicare] 5 mg PO DAILY 08/16/16 05/19/17


 


Azithromycin [Zithromax] 250 mg PO DAILY #6 tablet 01/24/17 05/19/17


 


Heparin Sodium,Porcine/Pf [Heparin 1,600 units IV DAILY 01/24/17 05/19/17





1,000 Unit/10 (100/ml)]   














- Allergies


Allergies/Adverse Reactions: 


 Allergies











Allergy/AdvReac Type Severity Reaction Status Date / Time


 


adhesive tape Allergy  Rash Verified 07/07/17 12:46


 


promethazine HCl * AdvReac  Hallucinati Verified 04/15/17 17:14





[From Phenergan]   ons  














- Social History


Does the pt smoke?: No


Smoking Status: Never smoker


Does the pt drink ETOH?: Yes


Does the pt have substance abuse?: No





- Immunizations


Immunizations are current?: Yes





- POLST


Patient has POLST: No


POLST Status: Full Code





PD ED PE NORMAL





- Vitals


Vital signs reviewed: Yes





- General


General: Alert and oriented X 3, No acute distress





- HEENT


HEENT: PERRL, EOMI





- Cardiac


Cardiac: RRR, Other (3/6 SM LUSB, chronic per pt)





- Respiratory


Respiratory: No respiratory distress, Clear bilaterally





- Abdomen


Abdomen: Non tender





- Neuro


Neuro: Alert and oriented X 3, Normal speech





- Psych


Psych: Normal mood, Normal affect





Results





- Vitals


Vitals: 


 Vital Signs - 24 hr











  07/07/17





  11:35


 


Temperature 36.5 C


 


Heart Rate 90


 


Respiratory 18





Rate 


 


Blood Pressure 140/76 H


 


O2 Saturation 99








 Oxygen











O2 Source                      Room air

















- Labs


Labs: 


 Laboratory Tests











  07/07/17





  12:19


 


WBC  7.2


 


RBC  2.16 L


 


Hgb  6.8 L*


 


Hct  19.7 L*


 


MCV  91.2


 


MCH  31.7 H


 


MCHC  34.8


 


RDW  17.0 H


 


Plt Count  231


 


MPV  7.8 L


 


Neut #  5.1


 


Lymph #  1.1 L


 


Mono #  0.6


 


Eos #  0.3


 


Baso #  0.1


 


Absolute Nucleated RBC  0.00


 


Nucleated RBCs  0.0














PD MEDICAL DECISION MAKING





- ED course


ED course: 





She has a chronic upper GI bleed and requests transfusion, she has had a 

complete workup without source identification, and does not want admission to 

the hospital or further treatment other than transfusion.





Departure





- Departure


Disposition: 01 Home, Self Care


Clinical Impression: 


 ESRD (end stage renal disease)





GI bleed


Qualifiers:


 GI bleed type/associated pathology: unspecified gastrointestinal hemorrhage 

type Qualified Code(s): K92.2 - Gastrointestinal hemorrhage, unspecified





Anemia


Qualifiers:


 Iron deficiency anemia type: chronic blood loss 


Condition: Stable


Record reviewed to determine appropriate education?: Yes


Instructions:  ED Bleed UGI Stable


Comments: 


Call your doctor to arrange a follow-up appointment, make the next available 

appointment.  In the interim, return anytime if worse or if new symptoms 

develop.





Your blood pressure was elevated today on check into the emergency department.  

This does not mean that you have hypertension, it is a common phenomenon to 

come to the emergency department and have elevated blood pressure.  I recommend 

that she see her primary care physician within the week to have it rechecked 

when you are feeling better.

## 2017-07-11 ENCOUNTER — HOSPITAL ENCOUNTER (OUTPATIENT)
Dept: HOSPITAL 76 - SC | Age: 67
Discharge: HOME | End: 2017-07-11
Attending: NURSE PRACTITIONER
Payer: MEDICARE

## 2017-07-11 DIAGNOSIS — G47.33: Primary | ICD-10-CM

## 2017-07-11 PROCEDURE — 99214 OFFICE O/P EST MOD 30 MIN: CPT

## 2017-07-11 PROCEDURE — 99212 OFFICE O/P EST SF 10 MIN: CPT

## 2017-08-07 ENCOUNTER — HOSPITAL ENCOUNTER (EMERGENCY)
Dept: HOSPITAL 76 - ED | Age: 67
Discharge: HOME | End: 2017-08-07
Payer: MEDICARE

## 2017-08-07 VITALS — DIASTOLIC BLOOD PRESSURE: 82 MMHG | SYSTOLIC BLOOD PRESSURE: 176 MMHG

## 2017-08-07 DIAGNOSIS — D64.9: Primary | ICD-10-CM

## 2017-08-07 DIAGNOSIS — Z79.01: ICD-10-CM

## 2017-08-07 DIAGNOSIS — Z99.2: ICD-10-CM

## 2017-08-07 DIAGNOSIS — Q27.30: ICD-10-CM

## 2017-08-07 DIAGNOSIS — E78.00: ICD-10-CM

## 2017-08-07 DIAGNOSIS — N18.6: ICD-10-CM

## 2017-08-07 DIAGNOSIS — K92.2: ICD-10-CM

## 2017-08-07 DIAGNOSIS — E11.22: ICD-10-CM

## 2017-08-07 LAB
ALBUMIN/GLOB SERPL: 1.2 {RATIO} (ref 1–2.2)
ANION GAP SERPL CALCULATED.4IONS-SCNC: 15 MMOL/L (ref 6–13)
BASOPHILS NFR BLD AUTO: 0.1 10^3/UL (ref 0–0.1)
BASOPHILS NFR BLD AUTO: 1 %
BILIRUB BLD-MCNC: 0.6 MG/DL (ref 0.2–1)
BUN SERPL-MCNC: 72 MG/DL (ref 6–20)
CALCIUM UR-MCNC: 7.9 MG/DL (ref 8.5–10.3)
CHLORIDE SERPL-SCNC: 95 MMOL/L (ref 101–111)
CO2 SERPL-SCNC: 24 MMOL/L (ref 21–32)
CREAT SERPLBLD-SCNC: 9.8 MG/DL (ref 0.4–1)
EOSINOPHIL # BLD AUTO: 0.3 10^3/UL (ref 0–0.7)
EOSINOPHIL NFR BLD AUTO: 4 %
ERYTHROCYTE [DISTWIDTH] IN BLOOD BY AUTOMATED COUNT: 17.6 % (ref 12–15)
GFRSERPLBLD MDRD-ARVRAT: 4 ML/MIN/{1.73_M2} (ref 89–?)
GLOBULIN SER-MCNC: 3.2 G/DL (ref 2.1–4.2)
GLUCOSE SERPL-MCNC: 83 MG/DL (ref 70–100)
HCT VFR BLD AUTO: 20.4 % (ref 37–47)
HGB UR QL STRIP: 6.8 G/DL (ref 12–16)
INR PPP: 1.2 (ref 0.8–1.2)
LIPASE SERPL-CCNC: 44 U/L (ref 22–51)
LYMPHOCYTES # SPEC AUTO: 0.9 10^3/UL (ref 1.5–3.5)
LYMPHOCYTES NFR BLD AUTO: 13.1 %
MCH RBC QN AUTO: 31.1 PG (ref 27–31)
MCHC RBC AUTO-ENTMCNC: 33.5 G/DL (ref 32–36)
MCV RBC AUTO: 93.1 FL (ref 81–99)
MONOCYTES # BLD AUTO: 0.8 10^3/UL (ref 0–1)
MONOCYTES NFR BLD AUTO: 12.6 %
NEUTROPHILS # BLD AUTO: 4.5 10^3/UL (ref 1.5–6.6)
NEUTROPHILS # SNV AUTO: 6.5 X10^3/UL (ref 4.8–10.8)
NEUTROPHILS NFR BLD AUTO: 69.3 %
NRBC # BLD AUTO: 0 /100WBC
PDW BLD AUTO: 6.5 FL (ref 7.9–10.8)
POTASSIUM SERPL-SCNC: 4.5 MMOL/L (ref 3.5–5)
PROT SPEC-MCNC: 7.1 G/DL (ref 6.7–8.2)
PROTHROM ACT/NOR PPP: 13.2 SECS (ref 9.9–12.6)
RBC MAR: 2.19 10^6/UL (ref 4.2–5.4)
SODIUM SERPLBLD-SCNC: 134 MMOL/L (ref 135–145)
WBC # BLD: 6.5 X10^3/UL

## 2017-08-07 PROCEDURE — 86850 RBC ANTIBODY SCREEN: CPT

## 2017-08-07 PROCEDURE — 36415 COLL VENOUS BLD VENIPUNCTURE: CPT

## 2017-08-07 PROCEDURE — 86901 BLOOD TYPING SEROLOGIC RH(D): CPT

## 2017-08-07 PROCEDURE — 85025 COMPLETE CBC W/AUTO DIFF WBC: CPT

## 2017-08-07 PROCEDURE — 99284 EMERGENCY DEPT VISIT MOD MDM: CPT

## 2017-08-07 PROCEDURE — 85610 PROTHROMBIN TIME: CPT

## 2017-08-07 PROCEDURE — 99283 EMERGENCY DEPT VISIT LOW MDM: CPT

## 2017-08-07 PROCEDURE — 83690 ASSAY OF LIPASE: CPT

## 2017-08-07 PROCEDURE — 86900 BLOOD TYPING SEROLOGIC ABO: CPT

## 2017-08-07 PROCEDURE — 86920 COMPATIBILITY TEST SPIN: CPT

## 2017-08-07 PROCEDURE — 80053 COMPREHEN METABOLIC PANEL: CPT

## 2017-08-07 PROCEDURE — 36430 TRANSFUSION BLD/BLD COMPNT: CPT

## 2017-08-07 PROCEDURE — 93005 ELECTROCARDIOGRAM TRACING: CPT

## 2017-08-07 NOTE — ED PHYSICIAN DOCUMENTATION
History of Present Illness





- Stated complaint


Stated Complaint: HEMOGLOBIN 7.0





- Chief complaint


Chief Complaint: General





- Additonal information


Additional information: 


This patient is a 66-year-old female with history of end-stage renal failure on 

hemodialysis who requires regular transfusions.  She has a history of atrial 

arterial venous malformations and occasionally has bleeding off and on.  This 

is an ongoing issue and she has had capsule endoscopy regular endoscopy, and 

colonoscopies as well as tagged red blood cell studies.  She presents with a 

complaint of generalized malaise and fatigue with a diagnosis of anemia 

hemoglobin 7 mg/dL on Saturday.  She denies any chest pain or shortness of 

breath her bleeding is actually tapering off and is back to its baseline.





Review of systems:





For pertinent positive and negatives in the review of systems please see the 

history of present illness, otherwise all other systems have been reviewed and 

are negative.





Dragon disclaimer:





Parts of this medical record were created using voice recognition technology.  

Because of the inherent limitations of this system, occasional same sounding 

word substitutions do occur and persist despite proofreading.  Please read the 

document for context.











Review of Systems


Constitutional: denies: Fever, Chills


Respiratory: denies: Dyspnea, Cough


GI: reports: Bloody / black stool.  denies: Abdominal Swelling, Nausea, Vomiting


Neurologic: reports: Generalized weakness.  denies: Focal weakness, Numbness, 

Near syncope





PD PAST MEDICAL HISTORY





- Past Medical History


Past Medical History: Yes


Cardiovascular: High cholesterol


Endocrine/Autoimmune: Type 2 diabetes


GI: GI bleed, Other


: Dialysis, Other


Psych: Depression, Anxiety





- Past Surgical History


Past Surgical History: Yes





- Present Medications


Home Medications: 


 Ambulatory Orders











 Medication  Instructions  Recorded  Confirmed


 


Allopurinol 100 mg PO DAILY 04/12/15 08/07/17


 


Temazepam [Restoril] 7.5 mg PO DAILY PRN 04/12/15 08/07/17


 


buPROPion [Wellbutrin Xl] 300 mg PO DAILY 04/12/15 08/07/17


 


predniSONE [Deltasone] 2.5 mg PO BID 04/12/15 08/07/17


 


Citalopram Hydrobromide 40 mg PO DAILY 12/04/15 08/07/17





[Citalopram HBr]   


 


Furosemide 160 mg PO BID 12/04/15 08/07/17


 


Sevelamer Carbonate [Renvela] 3 tab PO TID 12/04/15 08/07/17


 


Calcitriol [Rocaltrol] 0.5 mcg PO DAILY 08/16/16 08/07/17


 


Calcium Carbonate 1,500 mg PO DAILY 08/16/16 08/07/17


 


Cinacalcet HCl [Sensipar] 60 mg PO DAILY 08/16/16 08/07/17


 


Levothyroxine Sodium [Unithroid] 150 mcg PO DAILY 08/16/16 08/07/17


 


Omeprazole 40 mg PO DAILY 08/16/16 08/07/17


 


Rosuvastatin Calcium [Crestor] 20 mg PO DAILY 08/16/16 08/07/17


 


Solifenacin Succinate [Vesicare] 5 mg PO DAILY 08/16/16 08/07/17


 


Heparin Sodium,Porcine/Pf [Heparin 1,600 units IV DAILY 01/24/17 08/07/17





1,000 Unit/10 (100/ml)]   


 


amLODIPine [Norvasc] 0 mg PO DAILY 08/07/17 08/07/17














- Allergies


Allergies/Adverse Reactions: 


 Allergies











Allergy/AdvReac Type Severity Reaction Status Date / Time


 


adhesive tape Allergy  Rash Verified 08/07/17 13:37


 


promethazine HCl * AdvReac  Hallucinati Verified 08/07/17 13:37





[From Phenergan]   ons  














- Social History


Does the pt smoke?: No


Smoking Status: Never smoker


Does the pt drink ETOH?: Yes


Does the pt have substance abuse?: No





- Immunizations


Immunizations are current?: Yes





- POLST


Patient has POLST: No


POLST Status: Full Code





PD ED PE NORMAL





- Vitals


Vital signs reviewed: Yes





- General


General: Alert and oriented X 3, No acute distress, Well developed/nourished





- HEENT


HEENT: Atraumatic





- Neck


Neck: Supple, no meningeal sign





- Cardiac


Cardiac: RRR, No murmur





- Respiratory


Respiratory: No respiratory distress, Clear bilaterally





- Abdomen


Abdomen: Normal bowel sounds





- Female 


Female : Deferred, Pt declined





- Extremities


Extremities: No deformity, No tenderness to palpate, Normal ROM s pain





- Neuro


Neuro: Alert and oriented X 3





- Psych


Psych: Normal mood, Normal affect





Results





- Vitals


Vitals: 





 Vital Signs - 24 hr











  08/07/17





  13:32


 


Temperature 36 C L


 


Heart Rate 85


 


Respiratory 20





Rate 


 


Blood Pressure 161/79 H


 


O2 Saturation 98








 Oxygen











O2 Source                      Room air

















- Labs


Labs: 





 Laboratory Tests











  08/07/17 08/07/17 08/07/17





  14:17 14:17 14:17


 


WBC  6.5  


 


RBC  2.19 L  


 


Hgb  6.8 L*  


 


Hct  20.4 L  


 


MCV  93.1  


 


MCH  31.1 H  


 


MCHC  33.5  


 


RDW  17.6 H  


 


Plt Count  257  


 


MPV  6.5 L  


 


Neut #  4.5  


 


Lymph #  0.9 L  


 


Mono #  0.8  


 


Eos #  0.3  


 


Baso #  0.1  


 


Absolute Nucleated RBC  0.00  


 


Nucleated RBCs  0.0  


 


PT   13.2 H 


 


INR   1.2 


 


Sodium    134 L


 


Potassium    4.5


 


Chloride    95 L


 


Carbon Dioxide    24


 


Anion Gap    15.0 H


 


BUN    72 H


 


Creatinine    9.8 H*


 


Estimated GFR (MDRD)    4 L


 


Glucose    83


 


Calcium    7.9 L


 


Total Bilirubin    0.6


 


AST    19


 


ALT    18


 


Alkaline Phosphatase    75


 


Total Protein    7.1


 


Albumin    3.9


 


Globulin    3.2


 


Albumin/Globulin Ratio    1.2


 


Lipase    44


 


Blood Type   


 


Antibody Screen   


 


Crossmatch IS Only   














  08/07/17 08/07/17





  14:17 14:17


 


WBC  


 


RBC  


 


Hgb  


 


Hct  


 


MCV  


 


MCH  


 


MCHC  


 


RDW  


 


Plt Count  


 


MPV  


 


Neut #  


 


Lymph #  


 


Mono #  


 


Eos #  


 


Baso #  


 


Absolute Nucleated RBC  


 


Nucleated RBCs  


 


PT  


 


INR  


 


Sodium  


 


Potassium  


 


Chloride  


 


Carbon Dioxide  


 


Anion Gap  


 


BUN  


 


Creatinine  


 


Estimated GFR (MDRD)  


 


Glucose  


 


Calcium  


 


Total Bilirubin  


 


AST  


 


ALT  


 


Alkaline Phosphatase  


 


Total Protein  


 


Albumin  


 


Globulin  


 


Albumin/Globulin Ratio  


 


Lipase  


 


Blood Type  A POSITIVE 


 


Antibody Screen  NEGATIVE 


 


Crossmatch IS Only  See Detail  See Detail














PD MEDICAL DECISION MAKING





- ED course


ED course: 


Patient is a pleasant 66-year-old female who presents with a complaint of 

anemia and generalized fatigue.  This is an ongoing issue.  She has had lower 

GI bleeding off and on for years.  Known kidney lubricate the exact cause of 

the bleeding.  She bleeds most of the time.  The bleeding is at baseline.  

Normally here hemoglobin is 9-10 range.  She receives 1 unit when she is below 

810 2 when she is below 7.  Here she clinically shook looks well she is a short 

stocky female no apparent distress.  Her hemoglobin here is 6.8 she will be 

given 2 units packed red blood cells which are pending.  Her EKG shows a normal 

sinus rhythm normal CT QRS QT interval without ST segment elevation depression 

or T-wave inversion overall to normal EKG other blood work is unremarkable and 

consistent with her end-stage renal failure.  The patient is currently 

receiving 1 unit of blood with one more to go she will be discharged when this 

transfusion is complete





Disposition pending transfusion





Clinical impression:


1.  Acute on recurrent anemia


2.  End-stage renal failure on hemodialysis, Epogen, and iron infusions


3.  Recurrent chronic gastrointestinal bleeding from AVM

## 2017-08-30 ENCOUNTER — HOSPITAL ENCOUNTER (OUTPATIENT)
Dept: HOSPITAL 76 - SC | Age: 67
Discharge: HOME | End: 2017-08-30
Attending: NURSE PRACTITIONER
Payer: MEDICARE

## 2017-08-30 ENCOUNTER — HOSPITAL ENCOUNTER (EMERGENCY)
Dept: HOSPITAL 76 - ED | Age: 67
Discharge: ANOTHER HOSPITAL | End: 2017-08-30
Payer: MEDICARE

## 2017-08-30 ENCOUNTER — HOSPITAL ENCOUNTER (OUTPATIENT)
Dept: HOSPITAL 76 - EMS | Age: 67
Discharge: ANOTHER HOSPITAL | End: 2017-08-30
Attending: SURGERY
Payer: MEDICARE

## 2017-08-30 VITALS — SYSTOLIC BLOOD PRESSURE: 150 MMHG | DIASTOLIC BLOOD PRESSURE: 66 MMHG

## 2017-08-30 DIAGNOSIS — G47.33: ICD-10-CM

## 2017-08-30 DIAGNOSIS — D63.1: ICD-10-CM

## 2017-08-30 DIAGNOSIS — G47.33: Primary | ICD-10-CM

## 2017-08-30 DIAGNOSIS — E87.5: ICD-10-CM

## 2017-08-30 DIAGNOSIS — E11.22: Primary | ICD-10-CM

## 2017-08-30 DIAGNOSIS — N18.6: ICD-10-CM

## 2017-08-30 DIAGNOSIS — R53.1: Primary | ICD-10-CM

## 2017-08-30 DIAGNOSIS — Z99.2: ICD-10-CM

## 2017-08-30 LAB
ANION GAP SERPL CALCULATED.4IONS-SCNC: 11 MMOL/L (ref 6–13)
BASOPHILS NFR BLD AUTO: 0.1 10^3/UL (ref 0–0.1)
BASOPHILS NFR BLD AUTO: 1.1 %
BUN SERPL-MCNC: 59 MG/DL (ref 6–20)
CALCIUM UR-MCNC: 8.4 MG/DL (ref 8.5–10.3)
CHLORIDE SERPL-SCNC: 101 MMOL/L (ref 101–111)
CO2 SERPL-SCNC: 23 MMOL/L (ref 21–32)
CREAT SERPLBLD-SCNC: 6.5 MG/DL (ref 0.4–1)
EOSINOPHIL # BLD AUTO: 0.1 10^3/UL (ref 0–0.7)
EOSINOPHIL NFR BLD AUTO: 1.5 %
ERYTHROCYTE [DISTWIDTH] IN BLOOD BY AUTOMATED COUNT: 17.5 % (ref 12–15)
GFRSERPLBLD MDRD-ARVRAT: 6 ML/MIN/{1.73_M2} (ref 89–?)
GLUCOSE SERPL-MCNC: 113 MG/DL (ref 70–100)
HCT VFR BLD AUTO: 22.9 % (ref 37–47)
HGB UR QL STRIP: 7.7 G/DL (ref 12–16)
LYMPHOCYTES # SPEC AUTO: 0.8 10^3/UL (ref 1.5–3.5)
LYMPHOCYTES NFR BLD AUTO: 9.2 %
MCH RBC QN AUTO: 30.9 PG (ref 27–31)
MCHC RBC AUTO-ENTMCNC: 33.4 G/DL (ref 32–36)
MCV RBC AUTO: 92.6 FL (ref 81–99)
MONOCYTES # BLD AUTO: 0.4 10^3/UL (ref 0–1)
MONOCYTES NFR BLD AUTO: 4.4 %
NEUTROPHILS # BLD AUTO: 7.5 10^3/UL (ref 1.5–6.6)
NEUTROPHILS # SNV AUTO: 9 X10^3/UL (ref 4.8–10.8)
NEUTROPHILS NFR BLD AUTO: 83.8 %
NRBC # BLD AUTO: 0 /100WBC
PDW BLD AUTO: 6.9 FL (ref 7.9–10.8)
POTASSIUM SERPL-SCNC: 6.9 MMOL/L (ref 3.5–5)
RBC MAR: 2.47 10^6/UL (ref 4.2–5.4)
SODIUM SERPLBLD-SCNC: 135 MMOL/L (ref 135–145)
WBC # BLD: 9 X10^3/UL

## 2017-08-30 PROCEDURE — 93005 ELECTROCARDIOGRAM TRACING: CPT

## 2017-08-30 PROCEDURE — 99284 EMERGENCY DEPT VISIT MOD MDM: CPT

## 2017-08-30 PROCEDURE — 85025 COMPLETE CBC W/AUTO DIFF WBC: CPT

## 2017-08-30 PROCEDURE — 99285 EMERGENCY DEPT VISIT HI MDM: CPT

## 2017-08-30 PROCEDURE — 99212 OFFICE O/P EST SF 10 MIN: CPT

## 2017-08-30 PROCEDURE — 80048 BASIC METABOLIC PNL TOTAL CA: CPT

## 2017-08-30 PROCEDURE — 99214 OFFICE O/P EST MOD 30 MIN: CPT

## 2017-08-30 PROCEDURE — 94640 AIRWAY INHALATION TREATMENT: CPT

## 2017-08-30 PROCEDURE — 36415 COLL VENOUS BLD VENIPUNCTURE: CPT

## 2017-08-30 PROCEDURE — 96374 THER/PROPH/DIAG INJ IV PUSH: CPT

## 2017-08-30 PROCEDURE — 96375 TX/PRO/DX INJ NEW DRUG ADDON: CPT

## 2017-08-30 PROCEDURE — 71010: CPT

## 2017-08-30 PROCEDURE — 84132 ASSAY OF SERUM POTASSIUM: CPT

## 2017-08-30 NOTE — XRAY REPORT
EXAM: 

CHEST RADIOGRAPHY

 

EXAM DATE: 8/30/2017 02:37 PM.

 

CLINICAL HISTORY: Dyspnea.

 

COMPARISON: 04/15/2017.

 

TECHNIQUE: 1 view.

 

FINDINGS:

Lungs/Pleura: There is diffuse pulmonary vascular congestion without significant change. No developin
g consolidative pneumonia.

 

Mediastinum: Cardiac silhouette is prominent in size.

 

Other: None.

 

IMPRESSION: Cardiomegaly with pulmonary vascular congestion appears without significant interval pinzon
ge.

 

[facility]

Referring Provider Line: [phone]

 

SITE ID: [id number]

## 2017-08-30 NOTE — XRAY PRELIMINARY REPORT
Accession: [id number]

Exam: XR Chest 1 View

 

IMPRESSION: Cardiomegaly with pulmonary vascular congestion appears without significant interval pinzon
.

 

[facility]

 

SITE ID: [id number]

## 2017-08-30 NOTE — ED PHYSICIAN DOCUMENTATION
PD HPI FOCAL NEURO





- Stated complaint


Stated Complaint: WEAKNESS





- Chief complaint


Chief Complaint: General





- History obtained from


History obtained from: Patient





- History of Present Illness


Timing - onset: How many days ago (2-3 days of general weakness, thinks her 

blood count may be too low. No URI nor infectious symptoms per se. Had dialysis 

yesterday.)


Timing - duration: Days


Timing - details: Gradual onset, Still present


Severity of deficit: Moderate


Weakness: Other (generalized.)


Numbness: No: Face, Arm, Leg


Associated symptoms: No: Headache, Nausea / vomiting, Fall, Chest pain


Contributing factors: negative: Anticoagulated


Baseline status: positive: A&OX3, ambulatory, indep


Similar symptoms before: Diagnosis (has felt this way with low blood counts 

needing transfusion.)


Recently seen: Clinic (dialysis yesterday in [place]; usually does home 

hemodialysis, but had it at dialysis center due to problem with vision 

currently.)





Review of Systems


Constitutional: reports: Fatigue.  denies: Fever, Chills, Myalgias


Nose: denies: Rhinorrhea / runny nose, Congestion


Throat: denies: Sore throat


Cardiac: denies: Chest pain / pressure


Respiratory: denies: Dyspnea, Cough


GI: reports: Bloody / black stool (some blood in stool last week, not currently.

).  denies: Abdominal Pain, Vomiting, Diarrhea


: denies: Dysuria, Frequency


Skin: denies: Rash, Lesions


Neurologic: reports: Generalized weakness.  denies: Near syncope


Endocrine: denies: Weight loss


Immunocompromised: denies: Immunocompromised





PD PAST MEDICAL HISTORY





- Past Medical History


Cardiovascular: High cholesterol


Endocrine/Autoimmune: Type 2 diabetes


GI: GI bleed, Other


: Dialysis, Other


Psych: Depression, Anxiety





- Past Surgical History


Past Surgical History: Yes





- Present Medications


Home Medications: 


 Ambulatory Orders











 Medication  Instructions  Recorded  Confirmed


 


Allopurinol 100 mg PO DAILY 04/12/15 08/07/17


 


Temazepam [Restoril] 7.5 mg PO DAILY PRN 04/12/15 08/07/17


 


buPROPion [Wellbutrin Xl] 300 mg PO DAILY 04/12/15 08/07/17


 


predniSONE [Deltasone] 2.5 mg PO BID 04/12/15 08/07/17


 


Citalopram Hydrobromide 40 mg PO DAILY 12/04/15 08/07/17





[Citalopram HBr]   


 


Furosemide 160 mg PO BID 12/04/15 08/07/17


 


Sevelamer Carbonate [Renvela] 3 tab PO TID 12/04/15 08/07/17


 


Calcitriol [Rocaltrol] 0.5 mcg PO DAILY 08/16/16 08/07/17


 


Calcium Carbonate 1,500 mg PO DAILY 08/16/16 08/07/17


 


Cinacalcet HCl [Sensipar] 60 mg PO DAILY 08/16/16 08/07/17


 


Levothyroxine Sodium [Unithroid] 150 mcg PO DAILY 08/16/16 08/07/17


 


Omeprazole 40 mg PO DAILY 08/16/16 08/07/17


 


Rosuvastatin Calcium [Crestor] 20 mg PO DAILY 08/16/16 08/07/17


 


Solifenacin Succinate [Vesicare] 5 mg PO DAILY 08/16/16 08/07/17


 


Heparin Sodium,Porcine/Pf [Heparin 1,600 units IV DAILY 01/24/17 08/07/17





1,000 Unit/10 (100/ml)]   


 


amLODIPine [Norvasc] 0 mg PO DAILY 08/07/17 08/07/17














- Allergies


Allergies/Adverse Reactions: 


 Allergies











Allergy/AdvReac Type Severity Reaction Status Date / Time


 


adhesive tape Allergy  Rash Verified 08/30/17 12:25


 


promethazine HCl * AdvReac  Hallucinati Verified 08/30/17 12:25





[From Phenergan]   ons  














- Social History


Does the pt smoke?: No


Smoking Status: Never smoker


Does the pt drink ETOH?: Yes


Does the pt have substance abuse?: No





- Family History


Family history: reports: Non contributory





- Immunizations


Immunizations are current?: Yes





- POLST


Patient has POLST: No


POLST Status: Full Code





PD ED PE NORMAL





- Vitals


Vital signs reviewed: Yes





- General


General: Alert and oriented X 3, No acute distress, Well developed/nourished





- HEENT


HEENT: Ears normal, Moist mucous membranes, Pharynx benign





- Neck


Neck: Supple, no meningeal sign, No adenopathy





- Cardiac


Cardiac: RRR, No murmur





- Respiratory


Respiratory: Clear bilaterally





- Abdomen


Abdomen: Soft, Non tender





- Female 


Female : Deferred





- Rectal


Rectal: Deferred





- Back


Back: No CVA TTP





- Derm


Derm: Normal color, Warm and dry





- Extremities


Extremities: Normal ROM s pain, No edema, No calf tenderness / cord, Other (

left arm fistula without signs of infection. good thrill felt in fistula. )





- Neuro


Neuro: Alert and oriented X 3, No motor deficit, No sensory deficit, Normal 

speech





- Psych


Psych: Normal mood, Normal affect





Results





- Vitals


Vitals: 


 Vital Signs - 24 hr











  08/30/17 08/30/17 08/30/17





  12:22 15:00 17:02


 


Temperature 36.7 C  


 


Heart Rate 92 95 87


 


Respiratory 14 18 20





Rate   


 


Blood Pressure 164/81 H 162/81 H 


 


O2 Saturation 99 99 95














  08/30/17 08/30/17 08/30/17





  17:30 17:48 17:57


 


Temperature  36.7 C 


 


Heart Rate 86 95 


 


Respiratory 14 27 H 17





Rate   


 


Blood Pressure  150/66 H 


 


O2 Saturation  92 








 Oxygen











O2 Source                      Room air

















- Labs


Labs: 


 Laboratory Tests











  08/30/17 08/30/17 08/30/17





  13:32 13:32 15:30


 


WBC  9.0  


 


RBC  2.47 L  


 


Hgb  7.7 L  


 


Hct  22.9 L  


 


MCV  92.6  


 


MCH  30.9  


 


MCHC  33.4  


 


RDW  17.5 H  


 


Plt Count  258  


 


MPV  6.9 L  


 


Neut #  7.5 H  


 


Lymph #  0.8 L  


 


Mono #  0.4  


 


Eos #  0.1  


 


Baso #  0.1  


 


Absolute Nucleated RBC  0.00  


 


Nucleated RBCs  0.0  


 


Sodium   135 


 


Potassium   6.9 H*  6.8 H*


 


Chloride   101 


 


Carbon Dioxide   23 


 


Anion Gap   11.0 


 


BUN   59 H 


 


Creatinine   6.5 H 


 


Estimated GFR (MDRD)   6 L 


 


Glucose   113 H 


 


POC Whole Bld Glucose   


 


Calcium   8.4 L 














  08/30/17





  17:53


 


WBC 


 


RBC 


 


Hgb 


 


Hct 


 


MCV 


 


MCH 


 


MCHC 


 


RDW 


 


Plt Count 


 


MPV 


 


Neut # 


 


Lymph # 


 


Mono # 


 


Eos # 


 


Baso # 


 


Absolute Nucleated RBC 


 


Nucleated RBCs 


 


Sodium 


 


Potassium 


 


Chloride 


 


Carbon Dioxide 


 


Anion Gap 


 


BUN 


 


Creatinine 


 


Estimated GFR (MDRD) 


 


Glucose 


 


POC Whole Bld Glucose  240 H


 


Calcium 














- Rads (name of study)


  ** chest


Radiology: Prelim report reviewed (no acute process)





PD MEDICAL DECISION MAKING





- ED course


Complexity details: reviewed results, considered differential (blood count not 

low enough for transfusion. However potassium elevated at 6.9. Patient prefers 

to hear from Dr. Noriega before treatment. Waited for his return call and he 

confirmed need to treat, so patient agreed. ), d/w patient, d/w consultant (LIVIER Noriega - who says patient needs to transfer to [facility] for urgent dialysis. )





Departure





- Departure


Disposition: 02 Transfer Acute Care Hosp


Clinical Impression: 


 Weakness, Hyperkalemia, ESRD (end stage renal disease)





Anemia


Qualifiers:


 Anemia type: unspecified type Qualified Code(s): D64.9 - Anemia, unspecified


Condition: Stable


Record reviewed to determine appropriate education?: Yes


Discharge Date/Time: 08/30/17 18:05

## 2017-09-16 ENCOUNTER — HOSPITAL ENCOUNTER (EMERGENCY)
Dept: HOSPITAL 76 - ED | Age: 67
Discharge: HOME | End: 2017-09-16
Payer: MEDICARE

## 2017-09-16 VITALS — SYSTOLIC BLOOD PRESSURE: 155 MMHG | DIASTOLIC BLOOD PRESSURE: 83 MMHG

## 2017-09-16 DIAGNOSIS — Z99.2: ICD-10-CM

## 2017-09-16 DIAGNOSIS — I12.0: ICD-10-CM

## 2017-09-16 DIAGNOSIS — D64.9: Primary | ICD-10-CM

## 2017-09-16 DIAGNOSIS — N18.6: ICD-10-CM

## 2017-09-16 DIAGNOSIS — E11.22: ICD-10-CM

## 2017-09-16 LAB
ALBUMIN/GLOB SERPL: 1.3 {RATIO} (ref 1–2.2)
ANION GAP SERPL CALCULATED.4IONS-SCNC: 13 MMOL/L (ref 6–13)
BASOPHILS NFR BLD AUTO: 0 10^3/UL (ref 0–0.1)
BASOPHILS NFR BLD AUTO: 0.4 %
BILIRUB BLD-MCNC: 0.2 MG/DL (ref 0.2–1)
BUN SERPL-MCNC: 26 MG/DL (ref 6–20)
CALCIUM UR-MCNC: 8.5 MG/DL (ref 8.5–10.3)
CHLORIDE SERPL-SCNC: 96 MMOL/L (ref 101–111)
CO2 SERPL-SCNC: 26 MMOL/L (ref 21–32)
CREAT SERPLBLD-SCNC: 3.5 MG/DL (ref 0.4–1)
EOSINOPHIL # BLD AUTO: 0.1 10^3/UL (ref 0–0.7)
EOSINOPHIL NFR BLD AUTO: 1.5 %
ERYTHROCYTE [DISTWIDTH] IN BLOOD BY AUTOMATED COUNT: 17.1 % (ref 12–15)
ERYTHROCYTE [DISTWIDTH] IN BLOOD BY AUTOMATED COUNT: 17.7 % (ref 12–15)
GFRSERPLBLD MDRD-ARVRAT: 13 ML/MIN/{1.73_M2} (ref 89–?)
GLOBULIN SER-MCNC: 3 G/DL (ref 2.1–4.2)
GLUCOSE SERPL-MCNC: 256 MG/DL (ref 70–100)
HCT VFR BLD AUTO: 19.5 % (ref 37–47)
HCT VFR BLD AUTO: 25.3 % (ref 37–47)
HGB UR QL STRIP: 6.7 G/DL (ref 12–16)
HGB UR QL STRIP: 8.5 G/DL (ref 12–16)
LIPASE SERPL-CCNC: 54 U/L (ref 22–51)
LYMPHOCYTES # SPEC AUTO: 0.9 10^3/UL (ref 1.5–3.5)
LYMPHOCYTES NFR BLD AUTO: 10 %
MCH RBC QN AUTO: 30.6 PG (ref 27–31)
MCH RBC QN AUTO: 32 PG (ref 27–31)
MCHC RBC AUTO-ENTMCNC: 33.4 G/DL (ref 32–36)
MCHC RBC AUTO-ENTMCNC: 34.5 G/DL (ref 32–36)
MCV RBC AUTO: 91.6 FL (ref 81–99)
MCV RBC AUTO: 93 FL (ref 81–99)
MONOCYTES # BLD AUTO: 0.9 10^3/UL (ref 0–1)
MONOCYTES NFR BLD AUTO: 9.3 %
NEUTROPHILS # BLD AUTO: 7.3 10^3/UL (ref 1.5–6.6)
NEUTROPHILS # SNV AUTO: 8.6 X10^3/UL (ref 4.8–10.8)
NEUTROPHILS # SNV AUTO: 9.2 X10^3/UL (ref 4.8–10.8)
NEUTROPHILS NFR BLD AUTO: 78.8 %
NRBC # BLD AUTO: 0.4 /100WBC
PDW BLD AUTO: 6.8 FL (ref 7.9–10.8)
PDW BLD AUTO: 7.1 FL (ref 7.9–10.8)
POTASSIUM SERPL-SCNC: 4 MMOL/L (ref 3.5–5)
PROT SPEC-MCNC: 7 G/DL (ref 6.7–8.2)
RBC MAR: 2.1 10^6/UL (ref 4.2–5.4)
RBC MAR: 2.76 10^6/UL (ref 4.2–5.4)
SODIUM SERPLBLD-SCNC: 135 MMOL/L (ref 135–145)
WBC # BLD: 9.2 X10^3/UL

## 2017-09-16 PROCEDURE — 36415 COLL VENOUS BLD VENIPUNCTURE: CPT

## 2017-09-16 PROCEDURE — 80053 COMPREHEN METABOLIC PANEL: CPT

## 2017-09-16 PROCEDURE — 99283 EMERGENCY DEPT VISIT LOW MDM: CPT

## 2017-09-16 PROCEDURE — 85025 COMPLETE CBC W/AUTO DIFF WBC: CPT

## 2017-09-16 PROCEDURE — 83690 ASSAY OF LIPASE: CPT

## 2017-09-16 PROCEDURE — 99284 EMERGENCY DEPT VISIT MOD MDM: CPT

## 2017-09-16 PROCEDURE — 85027 COMPLETE CBC AUTOMATED: CPT

## 2017-09-16 PROCEDURE — 86850 RBC ANTIBODY SCREEN: CPT

## 2017-09-16 PROCEDURE — 86920 COMPATIBILITY TEST SPIN: CPT

## 2017-09-16 PROCEDURE — 86900 BLOOD TYPING SEROLOGIC ABO: CPT

## 2017-09-16 PROCEDURE — 36430 TRANSFUSION BLD/BLD COMPNT: CPT

## 2017-09-16 PROCEDURE — 86901 BLOOD TYPING SEROLOGIC RH(D): CPT

## 2017-09-16 NOTE — ED PHYSICIAN DOCUMENTATION
ED Addendum





- Addendum


Addendum: 





09/16/17 21:22


The nighttime hospitalist, Dr. schaefer, discussed the case with me and came to 

see the patient.  The patient described some symptoms of lightheadedness and 

dyspnea.  However she was improving having gotten 1 unit of blood at this point 

here in the ER.  Dr. wiggins felt the patient did not meet admission criteria 

and declined to place the patient in observation.  We will continue the 

transfusion here in the ER and transfer to discharge her from the ER when 

complete.

## 2017-09-16 NOTE — ED PHYSICIAN DOCUMENTATION
History of Present Illness





- Stated complaint


Stated Complaint: WEAKNESS





- Chief complaint


Chief Complaint: Abd Pain





- History obtained from


History obtained from: Patient





- History of Present Illness


Timing: How many days ago (2)





- Additonal information


Additional information: 





66-year-old female on hemodialysis Has an AV malformation and requires frequent 

transfusion.  She was called by her nephrologist today who asked her to come 

into the emergency department for transfusion as her hemoglobin was low 

yesterday at 6.4. She is symptomatic with exertional dyspnea fatigue and rapid 

heart rate.





Review of Systems


Constitutional: reports: Fatigue.  denies: Fever, Chills


Eyes: denies: Decreased vision


Ears: denies: Ear pain


Nose: denies: Congestion


Throat: denies: Sore throat


Cardiac: denies: Chest pain / pressure, Palpitations


Respiratory: reports: Dyspnea


GI: reports: Nausea.  denies: Abdominal Pain


: denies: Dysuria, Frequency


Skin: denies: Rash


Musculoskeletal: reports: Back pain.  denies: Neck pain


Neurologic: reports: Generalized weakness.  denies: Focal weakness, Numbness





PD PAST MEDICAL HISTORY





- Past Medical History


Cardiovascular: High cholesterol


Endocrine/Autoimmune: Type 2 diabetes


GI: GI bleed, Other


: Dialysis, Other


Psych: Depression, Anxiety





- Past Surgical History


Past Surgical History: Yes





- Present Medications


Home Medications: 


 Ambulatory Orders











 Medication  Instructions  Recorded  Confirmed


 


Allopurinol 100 mg PO DAILY 04/12/15 09/16/17


 


Temazepam [Restoril] 7.5 mg PO DAILY PRN 04/12/15 09/16/17


 


buPROPion [Wellbutrin Xl] 300 mg PO DAILY 04/12/15 09/16/17


 


predniSONE [Deltasone] 2.5 mg PO BID 04/12/15 09/16/17


 


Citalopram Hydrobromide 40 mg PO DAILY 12/04/15 09/16/17





[Citalopram HBr]   


 


Furosemide 160 mg PO BID 12/04/15 09/16/17


 


Sevelamer Carbonate [Renvela] 3 tab PO TID 12/04/15 09/16/17


 


Calcitriol [Rocaltrol] 0.5 mcg PO DAILY 08/16/16 09/16/17


 


Calcium Carbonate 1,500 mg PO DAILY 08/16/16 09/16/17


 


Cinacalcet HCl [Sensipar] 60 mg PO DAILY 08/16/16 09/16/17


 


Levothyroxine Sodium [Unithroid] 150 mcg PO DAILY 08/16/16 09/16/17


 


Omeprazole 40 mg PO DAILY 08/16/16 09/16/17


 


Rosuvastatin Calcium [Crestor] 20 mg PO DAILY 08/16/16 09/16/17


 


Solifenacin Succinate [Vesicare] 5 mg PO DAILY 08/16/16 09/16/17


 


amLODIPine [Norvasc] 0 mg PO DAILY 08/07/17 09/16/17














- Allergies


Allergies/Adverse Reactions: 


 Allergies











Allergy/AdvReac Type Severity Reaction Status Date / Time


 


adhesive tape Allergy  Rash Verified 09/16/17 14:20


 


promethazine HCl * AdvReac  Hallucinati Verified 09/16/17 14:20





[From Phenergan]   ons  














- Social History


Does the pt smoke?: No


Smoking Status: Never smoker


Does the pt drink ETOH?: Yes


Does the pt have substance abuse?: No





- Immunizations


Immunizations are current?: Yes





- POLST


Patient has POLST: No


POLST Status: Full Code





PD ED PE NORMAL





- Vitals


Vital signs reviewed: Yes (tachy )





- General


General: No acute distress, Well developed/nourished





- HEENT


HEENT: Atraumatic, PERRL, EOMI





- Neck


Neck: Supple, no meningeal sign





- Cardiac


Cardiac: RRR, Other (2/6 holosystolic murmer at LSB)





- Respiratory


Respiratory: No respiratory distress, Clear bilaterally





- Abdomen


Abdomen: Soft, Non tender





- Back


Back: No CVA TTP, No spinal TTP





- Derm


Derm: Normal color, Warm and dry, No rash





- Extremities


Extremities: No deformity, No edema





- Neuro


Neuro: No motor deficit, No sensory deficit





- Psych


Psych: Normal mood, Normal affect





Results





- Vitals


Vitals: 


 Vital Signs - 24 hr











  09/16/17





  14:17


 


Temperature 36.3 C L


 


Heart Rate 100


 


Respiratory 20





Rate 


 


Blood Pressure 133/72 H


 


O2 Saturation 99








 Oxygen











O2 Source                      Room air

















- Labs


Labs: 


 Laboratory Tests











  09/16/17 09/16/17 09/16/17





  15:00 15:00 15:53


 


WBC    9.2


 


RBC    2.10 L


 


Hgb    6.7 L*


 


Hct    19.5 L*


 


MCV    93.0


 


MCH    32.0 H


 


MCHC    34.5


 


RDW    17.7 H


 


Plt Count    362


 


MPV    7.1 L


 


Neut #    7.3 H


 


Lymph #    0.9 L


 


Mono #    0.9


 


Eos #    0.1


 


Baso #    0.0


 


Absolute Nucleated RBC    0.04


 


Nucleated RBCs    0.4


 


Sodium  135  


 


Potassium  4.0  


 


Chloride  96 L  


 


Carbon Dioxide  26  


 


Anion Gap  13.0  


 


BUN  26 H  


 


Creatinine  3.5 H  


 


Estimated GFR (MDRD)  13 L  


 


Glucose  256 H  


 


Calcium  8.5  


 


Total Bilirubin  0.2  


 


AST  27  


 


ALT  22  


 


Alkaline Phosphatase  81  


 


Total Protein  7.0  


 


Albumin  4.0  


 


Globulin  3.0  


 


Albumin/Globulin Ratio  1.3  


 


Lipase  54 H  


 


Blood Type   A POSITIVE 


 


Antibody Screen   NEGATIVE 


 


Crossmatch IS Only   See Detail 














Procedures





- IVC sono (time)


  ** 1400


Bedside IVC sono: IVC measures (cm) (0.83), IVC collapsed c insp (cm) (complete)

, Dehydration





PD MEDICAL DECISION MAKING





- ED course


Complexity details: reviewed old records, reviewed results, re-evaluated patient

, considered differential, d/w patient


ED course: 





66-year-old female with end-stage renal disease on dialysis has developed 

weakness exertional dyspnea tachycardia and fatigue and was asked to come to 

the emergency department by her nephrologist for blood transfusion.  She had 

her blood drawn 2 days ago her hemoglobin was 6.4.  She has had transfusion a 

number of times with improvement.  She does state that she is symptomatic at 

this time and really feels like she needs about 3 units of blood. She did have 

4 liters taken off today and  appears to have room for the blood transfusion.  

She does still make some urine. 





Departure





- Departure


Disposition: ED Place in Observation


Clinical Impression: 


 Symptomatic anemia

## 2017-09-17 NOTE — CONSULTATION NOTE
DATE OF CONSULTATION: 09/16/2017 8 pm

 

REQUESTING PROVIDER: ER physicians Dr. Turner/Dr. Pickett

 

REASON FOR CONSULTATION: I evaluated this patient for possible admission. 

 

CHIEF COMPLAINT: Hemoglobin of 6.7.

 

HISTORY OF PRESENT ILLNESS: The patient is a 66-year-old white female with past 
medical history of end-stage renal disease on dialysis Tuesday, Thursday, 
Saturday and with history of chronic anemia secondary to chronic ongoing 
gastrointestinal bleed, most likely secondary to arteriovenous malformations. 
Notably, this patient underwent extensive outpatient evaluation which included 
capsule endoscopy and further tests are planned to find the exact source of 
bleed. So far, attempts to find the source of bleed and to stop the bleed have 
been unsuccessful, and most recently, at least during the past 6 months, the 
patient has been managed by periodic blood transfusions. The patient tells me 
that she receives transfusion every 6 weeks. She was admitted to [facility] to receive transfusion for symptomatic anemia back in 05/2017. Since 
then, she received transfusion at least twice. She is supposed to go to the [facility] 
[place] or at times she was sent to [place] as well. The patient, however, 
tells me that receiving transfusion in the ER takes less time and she prefers 
to come to the ER for transfusions. How her transfusions are arranged and what 
is the exact outpatient procedure she goes through is unclear, but she tells me 
that in this particular time her nephrologist called her and told her that her 
hemoglobin was 6.7 and this is why she came to the ER to receive transfusion. 
She also tells me that she has no means of transportation or getting home 
tonight. Therefore, she wishes to be admitted to the hospital. Regarding her 
symptoms, initially she tells me that she has no nausea, vomiting, abdominal 
pain or fever. Her bowel movements are regular, she has 3-4 small stools daily 
and usually the stools are black but occasionally brown. There is no recent 
change in the character of her stools. There is no change in the amount or in 
the frequency. She does not report seeing brittny blood in her stool. She does 
not report fainting, although she tells me that when her potassium gets too 
high she has a tendency to fall. She denies syncopal episodes. She does not 
complain of palpitations. She does complain, however, of being exhausted and 
having difficulty tolerating physical activity; however, that has been going on 
for 3 years without recent change. In particular, she offers no new exertional 
symptoms, denies chest pain or shortness of breath.  

 

Interestingly, when I tell the patient that based on her stable vital signs and 
chronic nature of her problems, and based on the fact that she already received 
a unit transfusion in the ER, she does not need to be admitted to the hospital 
and there is no reason to observe her either, she tells me that she actually 
feels dizzy. Interestingly, she did deny dizziness when I asked her previously. 
Then when I discuss her stable vital signs, she again tells me that she has no 
means to get home; therefore, she needs to stay and be admitted to the 
hospital. 

 

ER workup reviewed per electronic medical record. The patient's baseline 
hemoglobin is usually between 7 and 8. She confirmed that. I reviewed 
laboratory work; chemistry panel is consistent with history of renal disease. 
Reviewed vital signs per EMR, during past visits HR had never been below 80. 
Current vital signa are stable with HR in the high 80s-low 90s. 

 

PAST MEDICAL HISTORY

1. End-stage renal disease on hemodialysis secondary to focal segmental 
glomerulonephritis diagnosed in 2005, status post transplant in 2007, failed 
transplant and back on dialysis in 2013. Dialysis days are Tuesday, Thursday, 
and Saturday.

2. History of GI bleed with extensive workup. The slow GI bleed is most likely 
secondary to AVMs, the patient requires periodic transfusions.

3. Chronic anemia secondary to chronic gastrointestinal blood loss/ plus chr 
renal disease. The patient's baseline hemoglobin is between 7 and 8.

4. Hypertension.

5. Dyslipidemia.

6. Diabetes.

7. Abdominal hernia. 

 

OUTPATIENT MEDICATIONS: Reviewed per electronic medical record.

 

REVIEW OF SYSTEMS: Please see pertinent positives and pertinent negatives 
listed above in history of present illness. A complete 12-point review was 
otherwise negative.

 

ALLERGIES: PROMETHAZINE.

 

SOCIAL HISTORY: The patient does not smoke. She is functional with activities 
of daily living.

 

FAMILY HISTORY: Reviewed and noncontributory to current presentation. Notably, 
the patient's brother has type 2 diabetes.

 

CODE STATUS: FULL CODE.

 

PHYSICAL EXAMINATION

VITAL SIGNS: Temperature 36.7, heart rate 90, blood pressure 139/75, 
respiration rate 18, oxygen saturation 97% on room air.

GENERAL: The patient is a well-developed female who is not in distress.

HEENT: Pale oral mucosa.

SKIN: No jaundice, mild pallor.

CARDIOVASCULAR: S1, S2 regular with harsh murmur best heard on the apex.

RESPIRATORY: Clear to auscultation without wheezes or crackles.

EXTREMITIES: No lymphedema.

MUSCULOSKELETAL: Unremarkable.

NEUROLOGIC: Alert, oriented, nonfocal.

PSYCH: Cooperative.

ABDOMEN: With large hernia, bowel sounds present, obese, soft, benign, 
nontender.

 

ASSESSMENT AND PLAN/ACTIVE ISSUES/DIAGNOSES

 

 

1. The patient has chronic anemia secondary to chronic slow gastrointestinal 
bleed, so far unknown etiology, thought to be secondary to arteriovenous 
malformations. Outpatient workup is still ongoing.

a. The patient is hemodynamically stable with stable vital signs.

b. There is no change in the character of gastrointestinal bleed.

c. There are no symptoms to indicate symptomatic anemia. Of note, the patient 
has chronic symptoms, which include chronic fatigue but denied shortness of 
breath, chest pain, dizziness, fainting, and she only reported feeling dizzy 
and contradicted herself after I told her that she did not need hospital 
admission.

d. The patient's baseline hemoglobin is between 7-8; therefore, she would need 
1 unit packed red blood cell transfusion, which was already given in the 
emergency room. The patient remained stable during transfusion and at the time 
of my bedside exam the transfusion was near being finished. Therefore, the 
patient does not need further monitoring, hospital admission or observation. If 
more transfusion needed, she can have that done as an outpatient or she could 
have a second unit ordered and be given in the emergency room if the emergency 
room physician feels that is appropriate.

2. End-stage renal disease on hemodialysis. The patient underwent dialysis 
earlier on 09/16/2017. She has a chemistry panel consistent with end-stage 
renal disease; however, unremarkable electrolytes.  There is no sign of fluid 
overload or any red flag which would require monitoring. 

3. Hyperglycemia with history of diabetes. Blood glucose, however, is below 
300. Therefore, does not require hospital admission. We will recommend better 
glycemic control. In addition, this glucose was not fasting.

4. Social issues. The patient reports that she does not have resources or means 
to get home, and she requested to be kept in the hospital out of convenience. 
We will request social work to assist with discharge plans.

5. Regarding chronic medical problems, I would recommend continuing outpatient 
medications unchanged.

6. Regarding the question if the patient should be admitted to the hospital or 
she should be observed, my assessment is that the patient does not meet 
admission criteria. Regarding observation issues, there is no new diagnosis we 
entertain or we would like to work up or monitor to keep this patient under 
observation status. Therefore, she does not meet observation criteria either. 
Notably, the patient has chronic conditions and she should be getting 
outpatient blood transfusions. I asked the patient about what is the usual 
outpatient process for her to get transfusions, and she provided somewhat vague 
answer. It seems, however, from what she says, that she comes to the emergency 
room because that takes the least amount of time and she feels that is the most 
convenient way.  

 

Time spent in evaluation of this patient including coordination of the care, 
discussing with daytime hospitalist, ER doctor, and getting information from 
the medical chart was 1 hour.

 

 

 

DD:09/16/2017 20:08:00  DT: 09/17/2017 09:05  JOB #: [id number]  EXT JOB #:[id number]

[email or web address]

## 2018-03-03 ENCOUNTER — HOSPITAL ENCOUNTER (OUTPATIENT)
Dept: HOSPITAL 76 - DI | Age: 68
Discharge: HOME | End: 2018-03-03
Attending: FAMILY MEDICINE
Payer: MEDICARE

## 2018-03-03 DIAGNOSIS — M51.36: Primary | ICD-10-CM

## 2018-03-03 DIAGNOSIS — M47.897: ICD-10-CM

## 2018-03-03 DIAGNOSIS — M51.37: ICD-10-CM

## 2018-03-03 PROCEDURE — 72100 X-RAY EXAM L-S SPINE 2/3 VWS: CPT

## 2018-03-05 NOTE — XRAY REPORT
EXAM:

LUMBOSACRAL SPINE RADIOGRAPHY

 

EXAM DATE: 3/3/2018 01:57 PM.

 

CLINICAL HISTORY: Acute on chronic low back pain.

 

COMPARISONS: None.

 

TECHNIQUE: 2 views.

 

FINDINGS:

Alignment: 6 degree convex left lumbar scoliosis. 3 mm grade 1 anterolisthesis of L4 on L5 vertebral 
body.

 

Bones: Five non-rib-bearing lumbar vertebral bodies are present. No fractures or bone lesions.

 

Disks: Moderate loss of disk space height at L3-L4 through L5-S1 levels.

 

Facets: Hypertrophic changes at L5-S1 level.

 

Sacroiliac Joints: Unremarkable.

 

Soft Tissues: Air-fluid levels are noted within relatively featureless appearing transverse colon. 
r overlying right groin, question if related to inguinal hernia versus abdominal pannus.

 

IMPRESSION: 

1. Moderate mid to lower lumbar spine degenerative changes.

2. No fracture evident.

3. Lucency overlying right groin, question if related to inguinal hernia. 

4. Relatively featureless transverse colon with air-fluid levels. Question ileus.

 

[facility]

Referring Provider Line: [phone]

 

SITE ID: [id number]

## 2018-03-08 ENCOUNTER — HOSPITAL ENCOUNTER (OUTPATIENT)
Dept: HOSPITAL 76 - EMS | Age: 68
Discharge: ANOTHER HOSPITAL | End: 2018-03-08
Attending: SURGERY
Payer: MEDICARE

## 2018-03-08 ENCOUNTER — HOSPITAL ENCOUNTER (EMERGENCY)
Dept: HOSPITAL 76 - ED | Age: 68
Discharge: HOME | End: 2018-03-08
Payer: MEDICARE

## 2018-03-08 VITALS — SYSTOLIC BLOOD PRESSURE: 134 MMHG | DIASTOLIC BLOOD PRESSURE: 81 MMHG

## 2018-03-08 DIAGNOSIS — G89.29: ICD-10-CM

## 2018-03-08 DIAGNOSIS — E11.9: ICD-10-CM

## 2018-03-08 DIAGNOSIS — M54.5: Primary | ICD-10-CM

## 2018-03-08 DIAGNOSIS — M62.830: Primary | ICD-10-CM

## 2018-03-08 PROCEDURE — 99283 EMERGENCY DEPT VISIT LOW MDM: CPT

## 2018-03-08 PROCEDURE — 99284 EMERGENCY DEPT VISIT MOD MDM: CPT

## 2018-03-08 PROCEDURE — 96361 HYDRATE IV INFUSION ADD-ON: CPT

## 2018-03-08 PROCEDURE — 96375 TX/PRO/DX INJ NEW DRUG ADDON: CPT

## 2018-03-08 PROCEDURE — 96374 THER/PROPH/DIAG INJ IV PUSH: CPT

## 2018-03-08 PROCEDURE — 96372 THER/PROPH/DIAG INJ SC/IM: CPT

## 2018-03-08 NOTE — ED PHYSICIAN DOCUMENTATION
PD HPI BACK PAIN





- Stated complaint


Stated Complaint: BACK PX





- Chief complaint


Chief Complaint: Back Pain





- History obtained from


History obtained from: Patient, EMS





- History of Present Illness


Timing - onset: How many weeks ago (2)


Timing - duration: Weeks (2)


Timing - details: Gradual onset


Pain level max: 10


Pain level now: 10


Location: Lower, Right


Quality: Pain, Spasm, Similar to prior episodes


Associated symptoms: No: Fever, Weakness, Numbness, Incontinent of urine, 

Unable to urinate, Hematuria, Incontinent of stool


Improves with: Rest


Worsened by: Movement, Twisting


Contributing factors: No: Cancer, IVDA


Similar symptoms before: Diagnosis (low back strain)


Recently seen: Clinic (x2 for same, negative xrays. On oxycodone and soma. 

States still having pain. can't use nsaids 2/2 CKD.)





Review of Systems


Constitutional: denies: Fever, Chills


Ears: denies: Ear pain


Nose: denies: Rhinorrhea / runny nose, Congestion


Throat: denies: Sore throat


Cardiac: denies: Chest pain / pressure


Respiratory: denies: Cough


GI: denies: Nausea, Vomiting, Diarrhea


Skin: denies: Rash


Musculoskeletal: denies: Neck pain


Neurologic: denies: Focal weakness, Numbness, Headache





PD PAST MEDICAL HISTORY





- Past Medical History


Past Medical History: Yes


Cardiovascular: High cholesterol


Endocrine/Autoimmune: Type 2 diabetes


GI: GI bleed, Other


: Dialysis, Other


Psych: Depression, Anxiety


Musculoskeletal: Chronic back pain





- Past Surgical History


Past Surgical History: Yes





- Present Medications


Home Medications: 


 Ambulatory Orders











 Medication  Instructions  Recorded  Confirmed


 


Allopurinol 100 mg PO DAILY 04/12/15 09/16/17


 


Temazepam [Restoril] 7.5 mg PO DAILY PRN 04/12/15 09/16/17


 


buPROPion [Wellbutrin Xl] 300 mg PO DAILY 04/12/15 09/16/17


 


predniSONE [Deltasone] 2.5 mg PO BID 04/12/15 09/16/17


 


Citalopram Hydrobromide 40 mg PO DAILY 12/04/15 09/16/17





[Citalopram HBr]   


 


Furosemide 160 mg PO BID 12/04/15 09/16/17


 


Sevelamer Carbonate [Renvela] 3 tab PO TID 12/04/15 09/16/17


 


Calcitriol [Rocaltrol] 0.5 mcg PO DAILY 08/16/16 09/16/17


 


Calcium Carbonate 1,500 mg PO DAILY 08/16/16 09/16/17


 


Cinacalcet HCl [Sensipar] 60 mg PO DAILY 08/16/16 09/16/17


 


Levothyroxine Sodium [Unithroid] 150 mcg PO DAILY 08/16/16 09/16/17


 


Omeprazole 40 mg PO DAILY 08/16/16 09/16/17


 


Rosuvastatin Calcium [Crestor] 20 mg PO DAILY 08/16/16 09/16/17


 


Solifenacin Succinate [Vesicare] 5 mg PO DAILY 08/16/16 09/16/17


 


amLODIPine [Norvasc] 0 mg PO DAILY 08/07/17 09/16/17


 


Lidocaine Patch 5% [Lidoderm Patch] 1 patch TOP DAILY PRN #10 patch 03/08/18 


 


diazePAM [Valium] 5 - 10 mg PO TID PRN #15 tablet 03/08/18 














- Allergies


Allergies/Adverse Reactions: 


 Allergies











Allergy/AdvReac Type Severity Reaction Status Date / Time


 


adhesive tape Allergy  Rash Verified 03/08/18 09:37


 


promethazine HCl * AdvReac  Hallucinati Verified 03/08/18 09:37





[From Phenergan]   ons  














- Social History


Does the pt smoke?: No


Smoking Status: Never smoker


Does the pt drink ETOH?: Yes


Does the pt have substance abuse?: No





- Immunizations


Immunizations are current?: Yes





- POLST


Patient has POLST: No


POLST Status: Full Code





PD ED PE NORMAL





- Vitals


Vital signs reviewed: Yes





- General


General: Alert and oriented X 3





- HEENT


HEENT: Moist mucous membranes, Pharynx benign





- Neck


Neck: Supple, no meningeal sign, No bony TTP





- Cardiac


Cardiac: RRR





- Respiratory


Respiratory: No respiratory distress, Clear bilaterally





- Abdomen


Abdomen: Soft, Non tender, Non distended





- Back


Back: No spinal TTP, Other (Paraspinal spasm low lumbar.  No midline tenderness 

to palpation or percussion.)





- Derm


Derm: Warm and dry





- Extremities


Extremities: No deformity, No tenderness to palpate





- Neuro


Neuro: Alert and oriented X 3, No motor deficit, No sensory deficit





- Psych


Psych: Normal mood, Normal affect





Results





- Vitals


Vitals: 


 Vital Signs - 24 hr











  03/08/18 03/08/18 03/08/18





  09:30 11:29 12:17


 


Temperature 36.2 C L  36.5 C


 


Heart Rate 98 93 92


 


Respiratory 16 15 20





Rate   


 


Blood Pressure 144/69 H 135/67 H 134/81 H


 


O2 Saturation 96 97 100








 Oxygen











O2 Source                      Room air

















PD MEDICAL DECISION MAKING





- ED course


Complexity details: reviewed results, re-evaluated patient, considered 

differential (no cauda equina, no spinal epidural abscess, no fracture, no 

aortic dissection or evidence of aneursym rupture), d/w patient, d/w family


ED course: 





Patient is a 67-year-old female who presents to the emergency department with 

acute on chronic back pain.  Had negative x-rays a few days ago.  No numbness 

or tingling.  No bowel incontinence.  No evidence of epidural abscess or cauda 

equina.  No evidence of fracture.  Pain well controlled.  We will have her 

follow-up with her doctor for further evaluation and care.  Also consulted 

social work who spoke with her doctor and will set up home PT for this weekend.

  Patient counseled regarding signs and symptoms for which I believe and urgent 

re-evaluation would be necessary. Patient with good understanding of and 

agreement to plan and is comfortable going home at this time





This document was made in part using voice recognition software. While efforts 

are made to proofread this document, sound alike and grammatical errors may 

occur.





Departure





- Departure


Disposition: 01 Home, Self Care


Clinical Impression: 


 Back spasm





Condition: Good


Instructions:  ED Spasm Back No Trauma


Follow-Up: 


Homer Borja MD [Primary Care Provider] - Within 1 week


Prescriptions: 


diazePAM [Valium] 5 - 10 mg PO TID PRN #15 tablet


 PRN Reason: Spasms


Lidocaine Patch 5% [Lidoderm Patch] 1 patch TOP DAILY PRN #10 patch


 PRN Reason: pain


Comments: 


Return if you worsen. do not take the valium with soma. You can continue the 

oxycodone as well.


Discharge Date/Time: 03/08/18 14:13

## 2018-03-17 ENCOUNTER — HOSPITAL ENCOUNTER (OUTPATIENT)
Dept: HOSPITAL 76 - EMS | Age: 68
Discharge: ANOTHER HOSPITAL | End: 2018-03-17
Attending: SURGERY
Payer: MEDICARE

## 2018-03-17 ENCOUNTER — HOSPITAL ENCOUNTER (EMERGENCY)
Dept: HOSPITAL 76 - ED | Age: 68
Discharge: HOME | End: 2018-03-17
Payer: MEDICARE

## 2018-03-17 VITALS — SYSTOLIC BLOOD PRESSURE: 135 MMHG | DIASTOLIC BLOOD PRESSURE: 75 MMHG

## 2018-03-17 DIAGNOSIS — K80.80: ICD-10-CM

## 2018-03-17 DIAGNOSIS — M54.5: Primary | ICD-10-CM

## 2018-03-17 DIAGNOSIS — E87.5: ICD-10-CM

## 2018-03-17 DIAGNOSIS — E11.22: ICD-10-CM

## 2018-03-17 DIAGNOSIS — K43.9: ICD-10-CM

## 2018-03-17 DIAGNOSIS — E87.1: ICD-10-CM

## 2018-03-17 DIAGNOSIS — E78.00: ICD-10-CM

## 2018-03-17 DIAGNOSIS — N18.6: ICD-10-CM

## 2018-03-17 DIAGNOSIS — Z94.0: ICD-10-CM

## 2018-03-17 DIAGNOSIS — Z99.2: ICD-10-CM

## 2018-03-17 LAB
ALBUMIN DIAFP-MCNC: 3.4 G/DL (ref 3.2–5.5)
ALBUMIN/GLOB SERPL: 0.8 {RATIO} (ref 1–2.2)
ALP SERPL-CCNC: 91 IU/L (ref 42–121)
ALT SERPL W P-5'-P-CCNC: 33 IU/L (ref 10–60)
ANION GAP SERPL CALCULATED.4IONS-SCNC: 17 MMOL/L (ref 6–13)
AST SERPL W P-5'-P-CCNC: 28 IU/L (ref 10–42)
BASOPHILS NFR BLD AUTO: 0.1 10^3/UL (ref 0–0.1)
BASOPHILS NFR BLD AUTO: 0.7 %
BILIRUB BLD-MCNC: 0.6 MG/DL (ref 0.2–1)
BUN SERPL-MCNC: 59 MG/DL (ref 6–20)
CALCIUM UR-MCNC: 9.3 MG/DL (ref 8.5–10.3)
CHLORIDE SERPL-SCNC: 87 MMOL/L (ref 101–111)
CO2 SERPL-SCNC: 24 MMOL/L (ref 21–32)
CREAT SERPLBLD-SCNC: 7.3 MG/DL (ref 0.4–1)
EOSINOPHIL # BLD AUTO: 0.1 10^3/UL (ref 0–0.7)
EOSINOPHIL NFR BLD AUTO: 0.6 %
ERYTHROCYTE [DISTWIDTH] IN BLOOD BY AUTOMATED COUNT: 15.4 % (ref 12–15)
GFRSERPLBLD MDRD-ARVRAT: 6 ML/MIN/{1.73_M2} (ref 89–?)
GLOBULIN SER-MCNC: 4.3 G/DL (ref 2.1–4.2)
GLUCOSE SERPL-MCNC: 206 MG/DL (ref 70–100)
HGB UR QL STRIP: 10.5 G/DL (ref 12–16)
LIPASE SERPL-CCNC: 45 U/L (ref 22–51)
LYMPHOCYTES # SPEC AUTO: 0.5 10^3/UL (ref 1.5–3.5)
LYMPHOCYTES NFR BLD AUTO: 4.6 %
MCH RBC QN AUTO: 30.4 PG (ref 27–31)
MCHC RBC AUTO-ENTMCNC: 34.1 G/DL (ref 32–36)
MCV RBC AUTO: 89.2 FL (ref 81–99)
MONOCYTES # BLD AUTO: 0.9 10^3/UL (ref 0–1)
MONOCYTES NFR BLD AUTO: 7.2 %
NEUTROPHILS # BLD AUTO: 10.3 10^3/UL (ref 1.5–6.6)
NEUTROPHILS # SNV AUTO: 11.8 X10^3/UL (ref 4.8–10.8)
NEUTROPHILS NFR BLD AUTO: 86.9 %
PDW BLD AUTO: 7.5 FL (ref 7.9–10.8)
PLATELET # BLD: 301 10^3/UL (ref 130–450)
PROT SPEC-MCNC: 7.7 G/DL (ref 6.7–8.2)
RBC MAR: 3.43 10^6/UL (ref 4.2–5.4)
SODIUM SERPLBLD-SCNC: 128 MMOL/L (ref 135–145)

## 2018-03-17 PROCEDURE — 80053 COMPREHEN METABOLIC PANEL: CPT

## 2018-03-17 PROCEDURE — 36415 COLL VENOUS BLD VENIPUNCTURE: CPT

## 2018-03-17 PROCEDURE — 83690 ASSAY OF LIPASE: CPT

## 2018-03-17 PROCEDURE — 74176 CT ABD & PELVIS W/O CONTRAST: CPT

## 2018-03-17 PROCEDURE — 85025 COMPLETE CBC W/AUTO DIFF WBC: CPT

## 2018-03-17 PROCEDURE — 99284 EMERGENCY DEPT VISIT MOD MDM: CPT

## 2018-03-17 PROCEDURE — 96374 THER/PROPH/DIAG INJ IV PUSH: CPT

## 2018-03-17 NOTE — ED PHYSICIAN DOCUMENTATION
History of Present Illness





- Stated complaint


Stated Complaint: BACK PX





- Chief complaint


Chief Complaint: Back Pain





- Additonal information


Additional information: 





hx from pt and EMS


67 female


ESRD s/p failed transplant not immunosupressed hemodialysis


has had approx 1 month of severe sharp right lumber region pain worse with 

movement


denies abd pain (but is tender to palp)


no fever


no CP cough SOA


no NVD


does not make urine


no numbness or weakness except as limited by pain


has been seen by PMD and ER for same, had xrays





Review of Systems


Constitutional: denies: Fever, Chills


Cardiac: denies: Chest pain / pressure


Respiratory: denies: Dyspnea, Cough


GI: denies: Abdominal Pain, Nausea, Vomiting, Diarrhea


: reports: Other (renal failure no urine)


Musculoskeletal: reports: Back pain


Neurologic: denies: Focal weakness, Numbness


Immunocompromised: denies: Immunocompromised





PD PAST MEDICAL HISTORY





- Past Medical History


Cardiovascular: High cholesterol


Endocrine/Autoimmune: Type 2 diabetes


GI: GI bleed, Other


: Dialysis, Other


Psych: Depression, Anxiety


Musculoskeletal: Chronic back pain





- Past Surgical History


Past Surgical History: Yes





- Present Medications


Home Medications: 


 Ambulatory Orders











 Medication  Instructions  Recorded  Confirmed


 


Allopurinol 100 mg PO DAILY 04/12/15 03/17/18


 


Temazepam [Restoril] 7.5 mg PO DAILY PRN 04/12/15 03/17/18


 


buPROPion [Wellbutrin Xl] 300 mg PO DAILY 04/12/15 03/17/18


 


predniSONE [Deltasone] 5 mg PO DAILY 04/12/15 03/17/18


 


Citalopram Hydrobromide 40 mg PO DAILY 12/04/15 03/17/18





[Citalopram HBr]   


 


Sevelamer Carbonate [Renvela] 3 tab PO TID 12/04/15 03/17/18


 


Calcitriol [Rocaltrol] 0.5 mcg PO DAILY 08/16/16 03/17/18


 


Calcium Carbonate 1,500 mg PO DAILY 08/16/16 03/17/18


 


Cinacalcet HCl [Sensipar] 60 mg PO DAILY 08/16/16 03/17/18


 


Levothyroxine Sodium [Unithroid] 200 mcg PO DAILY 08/16/16 03/17/18


 


Omeprazole 40 mg PO DAILY 08/16/16 03/17/18


 


Rosuvastatin Calcium [Crestor] 20 mg PO DAILY 08/16/16 03/17/18


 


amLODIPine [Norvasc] 15 mg PO DAILY 08/07/17 03/17/18


 


Lidocaine Patch 5% [Lidoderm Patch] 1 patch TOP DAILY PRN #10 patch 03/08/18 03/ 17/18


 


diazePAM [Valium] 5 - 10 mg PO TID PRN #15 tablet 03/08/18 03/17/18


 


oxyCODONE [Roxicodone] 5 mg PO Q4-6H #12 tablet 03/17/18 














- Allergies


Allergies/Adverse Reactions: 


 Allergies











Allergy/AdvReac Type Severity Reaction Status Date / Time


 


adhesive tape Allergy  Rash Verified 03/08/18 09:37


 


promethazine HCl * AdvReac  Hallucinati Verified 03/08/18 09:37





[From Phenergan]   ons  














- Social History


Does the pt smoke?: No


Smoking Status: Never smoker


Does the pt drink ETOH?: Yes


Does the pt have substance abuse?: No





- Immunizations


Immunizations are current?: Yes





- POLST


Patient has POLST: No


POLST Status: Full Code





PD ED PE NORMAL





- Vitals


Vital signs reviewed: Yes





- General


General: Alert and oriented X 3





- HEENT


HEENT: Atraumatic





- Neck


Neck: Supple, no meningeal sign





- Cardiac


Cardiac: RRR





- Respiratory


Respiratory: No respiratory distress, Clear bilaterally





- Abdomen


Abdomen: Soft, Other (large right lower quad ventral hermnia, diffuse abd TTP s 

rebound or guarding, no pulsatile mass appreciated but BMI is 34)





- Back


Back: No spinal TTP, Other (mostly right lumabr region TTP and extreme pain 

with any movement, no rash, no focal bony spine TTP redness or swelling)





- Derm


Derm: Normal color





- Extremities


Extremities: No deformity





- Neuro


Neuro: Alert and oriented X 3, No motor deficit, No sensory deficit, Other (

painful to move legs but hip flex knee ext foot dorsi plantar and great toe ext 

are intact, no clonus, neg SLR, nl sensation)





Results





- Vitals


Vitals: 


 Vital Signs - 24 hr











  03/17/18 03/17/18





  07:35 09:48


 


Temperature 36.7 C 


 


Heart Rate 88 74


 


Respiratory 18 16





Rate  


 


Blood Pressure 119/57 L 136/62 H


 


O2 Saturation 99 93








 Oxygen











O2 Source                      Room air

















- Labs


Labs: 


 Laboratory Tests











  03/17/18 03/17/18





  07:54 07:54


 


WBC  11.8 H 


 


RBC  3.43 L 


 


Hgb  10.5 L 


 


Hct  30.6 L 


 


MCV  89.2 


 


MCH  30.4 


 


MCHC  34.1 


 


RDW  15.4 H 


 


Plt Count  301 


 


MPV  7.5 L 


 


Neut #  10.3 H 


 


Lymph #  0.5 L 


 


Mono #  0.9 


 


Eos #  0.1 


 


Baso #  0.1 


 


Absolute Nucleated RBC  0.00 


 


Nucleated RBC %  0.0 


 


Sodium   128 L


 


Potassium   5.4 H


 


Chloride   87 L


 


Carbon Dioxide   24


 


Anion Gap   17.0 H


 


BUN   59 H


 


Creatinine   7.3 H*


 


Estimated GFR (MDRD)   6 L


 


Glucose   206 H


 


Calcium   9.3


 


Total Bilirubin   0.6


 


AST   28


 


ALT   33


 


Alkaline Phosphatase   91


 


Total Protein   7.7


 


Albumin   3.4


 


Globulin   4.3 H


 


Albumin/Globulin Ratio   0.8 L


 


Lipase   45














- Rads (name of study)


  ** CT AP non con 2.2 ESRD


Radiology: See rad report (no acute process - nl bones, native kidneys and RLQ 

transplant kidney are atrophic, large R ventral hernia contains bowel but not 

strangulated or incacerated, appendic vis and normal, no AAA, gallsotnes 

without wall thickening or pericholecystic fluid and nl ducts)





PD MEDICAL DECISION MAKING





- ED course


ED course: 





CT = no AAA or retroperitoneal hemorrhage abscess ect, no retroflexed 

appendicitis, all kidneys atrophic, henria not incarerated or starngulated, nl 

bones s mets etc


labs reviewed : ESRD not new, hyponatremia not new though slightly worse than 

prior (would not give IVF as pt is dialysis dependent but should be addressed 

in her next dialysis session) K 5.4 will also be addressed in dialysis and not 

high enough to merit emergent dialysis


apin is not spinal and pt has no fever so doubt epidural abscess


having ruled out more emergent causes of right lower lumbar pain will tx as 

soft tissue


pt states valium not working


chart review indicates pt already has lido patches soma valum and oxycodone for 

same and that on 3/8 EMP Dr Whitten spoke directly to PMD to arrange fup and PT


will dc 


pt has lido patches states valium not helping states hydrocodone did not help 

but oxycodone did and now she is out so will rx enough of that to last till 

Modnay when pt can see pMD for ongoing care





Departure





- Departure


Disposition: 01 Home, Self Care


Clinical Impression: 


Back pain


Qualifiers:


 Back pain location: low back pain Chronicity: unspecified Back pain laterality

: right Sciatica presence: without sciatica Qualified Code(s): M54.5 - Low back 

pain





Condition: Good


Instructions:  ED Neck Back Pain General


Follow-Up: 


Homer Borja MD [Primary Care Provider] -  (for ongoing pain management)


Prescriptions: 


oxyCODONE [Roxicodone] 5 mg PO Q4-6H #12 tablet


Comments: 


The CT scan did not show any new problem to explain the pain - you have the 

hernia and gallstones but no aneurysm abscess appendicitis to explain the right 

low back pain.


So it seem the pain is muscular and I have written for enough oxycodone to last 

you till Monday when you can see you PMD


Also your potassium was a little high and you sodium was low both of which 

should be addressed at your next dialysis session

## 2018-03-17 NOTE — CT PRELIMINARY REPORT
Accession: [id number]

Exam: CT ABDOMEN/PELVIS W/O

 

IMPRESSION: 

1. No bowel structure no inflammatory process associated with the bowel. 

2. Large right-sided ventral hernia containing multiple loops of nondilated small and large bowel. 

3. The native kidneys are atrophic. There is a right pelvic renal transplant which is also atrophic. 
No hydronephrosis or renal calculus identified. 

4. No free air or fluid in the abdomen or pelvis. 

5. Cholelithiasis without cholecystitis.

 

[facility]

 

SITE ID: [id number]

## 2018-03-17 NOTE — CT REPORT
EXAM:

CT ABDOMEN AND PELVIS

 

EXAM DATE: 3/17/2018 08:29 AM.

 

CLINICAL HISTORY: Right low back pain and abd TTP.

 

COMPARISONS: None.

 

TECHNIQUE: Routine helical CT imaging was performed through the abdomen and pelvis. IV contrast: Amt/
type. Enteric contrast: No. Reconstructions: Coronal and sagittal.

 

In accordance with CT protocol optimization, one or more of the following dose reduction techniques w
ere utilized for this exam: automated exposure control, adjustment of mA and/or KV based on patient s
ize, or use of iterative reconstructive technique.

 

FINDINGS: 

Lung Bases: Unremarkable.

 

Liver: Normal. No masses.

 

Gallbladder/Bile Ducts: Multiple stones layering in the dependent portion gallbladder. No gallbladder
 wall thickening or pericholecystic fluid. No intrahepatic or extrahepatic biliary ductal dilatation.


 

Spleen: Normal.

 

Pancreas: Normal.

 

Adrenal Glands: Normal.

 

Kidneys: The kidneys are atrophic. No hydronephrosis or renal calculus. There is a right pelvic kidne
y which is also atrophic. No hydronephrosis or perinephric fluid collection.

 

Peritoneal Cavity/Bowel: Large right ventral hernia containing multiple loops of nondilated small and
 large bowel measuring 7.5 cm the neck. The appendix is well visualized and normal.

 

Pelvic Organs: Normal. The bladder and visualized pelvic organs are within normal limits.

 

Vasculature: No aneurysms or other significant abnormality.

 

Bones: No significant abnormality.

 

Other: None.

 

IMPRESSION: 

1. No bowel structure no inflammatory process associated with the bowel. 

2. Large right-sided ventral hernia containing multiple loops of nondilated small and large bowel. 

3. The native kidneys are atrophic. There is a right pelvic renal transplant which is also atrophic. 
No hydronephrosis or renal calculus identified. 

4. No free air or fluid in the abdomen or pelvis. 

5. Cholelithiasis without cholecystitis.

 

[facility]

Referring Provider Line: [phone]

 

SITE ID: [id number]

## 2018-03-24 ENCOUNTER — HOSPITAL ENCOUNTER (OUTPATIENT)
Dept: HOSPITAL 76 - DI | Age: 68
Discharge: HOME | End: 2018-03-24
Attending: INTERNAL MEDICINE
Payer: MEDICARE

## 2018-03-24 DIAGNOSIS — G06.1: ICD-10-CM

## 2018-03-24 DIAGNOSIS — M46.26: Primary | ICD-10-CM

## 2018-03-24 DIAGNOSIS — M47.896: ICD-10-CM

## 2018-03-24 DIAGNOSIS — M47.897: ICD-10-CM

## 2018-03-24 DIAGNOSIS — M51.26: ICD-10-CM

## 2018-03-24 PROCEDURE — 72148 MRI LUMBAR SPINE W/O DYE: CPT

## 2018-03-24 PROCEDURE — 72146 MRI CHEST SPINE W/O DYE: CPT

## 2018-03-24 NOTE — MRI REPORT
EXAM:

MRI LUMBAR SPINE WITHOUT CONTRAST

 

EXAM DATE: 3/24/2018 03:38 PM.

 

CLINICAL HISTORY: SEVERE WORSENING LBP.

 

COMPARISON: CT abdomen and pelvis 03/17/2018.

 

TECHNIQUE: Multiplanar, multisequence T1-weighted and fluid-sensitive sequences of the lumbar spine f
rom T12 to S1 without contrast. Other: None.

 

FINDINGS: 

Spinal Cord: The conus terminates at L1. The thoracic cord inferior to T10 is negative for signal abn
ormality.

 

Alignment: Mild 10 degree leftward convex curvature of the lumbar spine.

 

Bone Marrow: Five non-rib-bearing lumbar vertebral bodies are assumed. There is marrow edema inferior
 L3 vertebral body and superior L4 vertebral body. Endplate destruction inferior L3 vertebral body an
d superior L4 vertebral body. Right L1 vertebral body benign hemangioma.

 

Disk Levels/Facets:

T12-L1: Unremarkable.

 

L1-L2: Posterior 2 mm disk protrusion.

 

L2-L3: Unremarkable.

 

L3-L4: Fluid signal L3-L4 disk interspace. Right anterior paravertebral lobe 1.5 x 1.5 cm transverse 
dimension 1.7 cm in height abscess. Paravertebral edema. Superior extension from the anterior disk ma
rgin of a prevertebral small 1.1 cm in height and 1.3 x 0.7 cm transverse dimension abscess extending
 to the mid anterior L3 vertebral body level. Edema seen deep to the left psoas muscle at the L4 leve
l. Facet joints are within normal limits. Negative for spinal canal stenosis.

 

L4-L5: Severe right and moderate left facet joint arthrosis. Mild right foraminal stenosis from nawaf
inal 2 mm disk protrusion and facet hypertrophy. Mild to moderate left foraminal stenosis from facet 
hypertrophy and 2 mm foraminal disk protrusion. Mild left lateral recess stenosis from ligamentum fla
vum thickening and facet hypertrophy.

 

L5-S1: Moderate bilateral facet joint arthrosis. Negative for spinal canal stenosis or foraminal sten
osis.

 

Musculature: Low lumbar and sacral posterior paraspinal muscle atrophy with fatty replacement.

 

Other: Severe chronic atrophy of the native kidneys bilaterally.

 

IMPRESSION: Osteomyelitis and diskitis L3-L4 interspace, negative for epidural abscess and there is s
mall prevertebral 1.3 x 0.7 x 1.1 cm and anterior right paravertebral 1.5 x 1.5 x 1.7 cm abscesses.

 

Critical result: Findings were phoned to Dr. Chino 2320 hrs. On 03/24/2016.

 

[facility]

Referring Provider Line: [phone]

 

SITE ID: [id number]

## 2018-03-24 NOTE — MRI REPORT
EXAM:

MRI THORACIC SPINE WITHOUT CONTRAST

 

EXAM DATE: 3/24/2018 03:38 PM.

 

CLINICAL HISTORY: SEVERE WORSENING LBP. Severe low back pain beginning 6 weeks ago.

 

COMPARISONS: MRI lumbar spine 03/24/2018..

 

TECHNIQUE: Multiplanar, multisequence T1-weighted and fluid-sensitive sequences of the thoracic spine
 from T6 to superior T12 without contrast. Other: None.

 

FINDINGS: 

Spinal Cord: No signal abnormality in the visualized spinal cord. 

 

Alignment: No scoliosis or spondylolisthesis.

 

Bone Marrow: Probable right L1 vertebral body benign hemangioma.

 

Disk Levels/Facets:

C7-T1: Unremarkable.

 

T1-T2: Moderate right facet joint arthrosis. Negative for spinal canal stenosis or foraminal stenosis
.

 

T2-T3: Mild left facet joint arthrosis.

 

T3-T4: Unremarkable.

 

T4-T5: Mild right facet joint arthrosis. Negative for spinal canal stenosis or foraminal stenosis.

 

T5-T6: Unremarkable.

 

T6-T7: Unremarkable.

 

T7-T8: Mild disk degeneration. The neural foramina are capacious. The facet joints are within normal 
limits.

 

T8-T9: Mild disk degeneration. Negative for spinal canal stenosis or foraminal stenosis. Anterior pritesh
dging osteophyte.

 

T9-T10: Unremarkable

 

T10-T11: Mild left facet joint arthrosis.

 

T11-T12: Unremarkable.

 

T12-L1: Unremarkable.

 

Musculature: Normal. No edema or fatty atrophy. 

Other: The visualized lungs, mediastinum, and abdominal cavity are unremarkable.

 

IMPRESSION: 

1. The thoracic cord is negative for signal abnormality. 

2. Negative for spinal canal stenosis, foraminal stenosis or epidural abscess. 

3. Negative for thoracic spine diskitis or osteomyelitis.

 

[facility]

Referring Provider Line: [phone]

 

SITE ID: [id number]

## 2018-04-14 ENCOUNTER — HOSPITAL ENCOUNTER (OUTPATIENT)
Dept: HOSPITAL 76 - LAB.R | Age: 68
Discharge: HOME | End: 2018-04-14
Attending: SKILLED NURSING FACILITY
Payer: COMMERCIAL

## 2018-04-14 DIAGNOSIS — A09: Primary | ICD-10-CM

## 2018-04-14 PROCEDURE — 87493 C DIFF AMPLIFIED PROBE: CPT

## 2018-05-19 ENCOUNTER — HOSPITAL ENCOUNTER (OUTPATIENT)
Dept: HOSPITAL 76 - DI | Age: 68
Discharge: HOME | End: 2018-05-19
Attending: INTERNAL MEDICINE
Payer: MEDICARE

## 2018-05-19 DIAGNOSIS — M46.26: Primary | ICD-10-CM

## 2018-05-19 DIAGNOSIS — M48.061: ICD-10-CM

## 2018-05-19 DIAGNOSIS — M51.86: ICD-10-CM

## 2018-05-19 PROCEDURE — 72148 MRI LUMBAR SPINE W/O DYE: CPT

## 2018-05-20 NOTE — MRI PRELIMINARY REPORT
Accession: [id number]

Exam: MRI LUMBAR SPINE W/O

 

IMPRESSION: 

1. Progression of diskitis and osteomyelitis at L3-L4. Increased erosions at the L3 and 4 endplate an
d L4 superior endplate. Focus of left anterior paramedian epidural tissue at the L3 level may represe
nt phlegmon/early forming abscess. Mild left-sided canal stenosis at the L3 level. Mild central canal
 stenosis at L3-L4 level. Mild-to-moderate right and mild left foraminal stenoses. 

2. Increased psoas muscle edema suggestive of myositis. No definite psoas muscle abscess is seen. 

3. Small asymmetric disk bulge towards the right at L4-L5. Mild-to-moderate foraminal stenoses. No ch
alphonse. 

4. Mild levoconvex scoliosis. 

5. Minimal grade 1 retrolisthesis at L3-L4.

 

Comment: The following findings are so common in adults without low back pain that while we report th
eir presence, they must be interpreted with caution and in the context of the clinical situation. (Re
denice Ortega et al, Spine 2001)

 

Prevalence of findings in patients without low back pain:

Disk degeneration (any evidence): 92%

Disk desiccation/T2 signal loss: 83%

Disk height loss: 56%

Disk bulge: 64%

Disk protrusion: 32%

Annular tear/high intensity zone: 38%

 

[facility]

 

SITE ID: [id number]

## 2018-05-20 NOTE — MRI REPORT
EXAM:

MRI LUMBAR SPINE WITHOUT CONTRAST

 

EXAM DATE: 5/19/2018 03:42 PM.

 

CLINICAL HISTORY: VERTEBRAL OSTEOMYELITIS.

 

COMPARISON: Lumbar spine radiography from 03/03/2018 and lumbar spine MRI from 03/24/2018.

 

TECHNIQUE: Multiplanar, multisequence T1-weighted and fluid-sensitive sequences of the lumbar spine f
rom T12 to S1 without contrast. Other: None.

 

FINDINGS: 

Spinal Cord: The conus terminates at T12-L1. The conus medullaris and cauda equina are unremarkable.

 

Alignment: Mild levoconvex scoliosis. Minimal retrolisthesis at L3-L4 by approximately 1-2 mm.

 

Bone Marrow: Five non-rib-bearing lumbar vertebral bodies are assumed. There is isointense signal wit
hin the visualized osseous structures consistent with marrow reconversion/hyperplasia. This is new si
nce the previous study. As before, there is a hemangioma at the right side of L1 vertebral body.

 

L3-L4: Worsening cortical irregularity at the L3 inferior endplate and L4 superior endplate. Marrow e
rosette at the inferior 1/3-1/2 of L3 vertebral body and the superior 1/3-1/2 of L4 vertebral body which
 has not changed significantly since the previous study. Moderate disk space narrowing. The disk heig
ht has decreased since the previous study. There is an approximately 1.2 cm superior-inferior by 1 cm
 medial to lateral by 0.3 cm AP new focus of isointense to slightly hyperintense left anterior parame
brandi epidural tissue at the L3 level (sagittal image 16 and axial images 18-20). The epidural tissue 
compresses the ventral left paramedian aspect of the thecal sac. There is persistent T2 hyperintense 
tissue or fluid within the disk space. There is slightly worse edema within the psoas muscles. No def
inite psoas muscle abscess is seen. Mild canal stenosis. Mild-to-moderate right and mild left foramin
al stenoses.

 

L5-S1: Moderate left and mild right facet arthropathy. No stenoses.

 

L4-L5: Small disk bulge which is asymmetric towards the right. Severe left and moderate to severe rig
ht facet arthropathy. Mild-to-moderate foraminal stenoses. No change.

 

L2-L3: Minimal disk bulge. No stenoses. No change.

 

L1-L2: Small disk bulge. Mild canal stenosis. No foraminal stenoses. No change.

 

T12-L1: Unremarkable.

 

Musculature: Mild to moderate fatty atrophy of the posterior paraspinal muscles.

 

Other: The kidneys are atrophic.

 

IMPRESSION: 

1. Progression of diskitis and osteomyelitis at L3-L4. Increased erosions at the L3 and 4 endplate an
d L4 superior endplate. Focus of left anterior paramedian epidural tissue at the L3 level may represe
nt phlegmon/early forming abscess. Mild left-sided canal stenosis at the L3 level. Mild central canal
 stenosis at L3-L4 level. Mild-to-moderate right and mild left foraminal stenoses. 

2. Increased psoas muscle edema suggestive of myositis. No definite psoas muscle abscess is seen. 

3. Small asymmetric disk bulge towards the right at L4-L5. Mild-to-moderate foraminal stenoses. No ch
alphonse. 

4. Mild levoconvex scoliosis. 

5. Minimal grade 1 retrolisthesis at L3-L4.

 

Comment: The following findings are so common in adults without low back pain that while we report th
eir presence, they must be interpreted with caution and in the context of the clinical situation. (Re
denice Ortega et al, Spine 2001)

 

Prevalence of findings in patients without low back pain:

Disk degeneration (any evidence): 92%

Disk desiccation/T2 signal loss: 83%

Disk height loss: 56%

Disk bulge: 64%

Disk protrusion: 32%

Annular tear/high intensity zone: 38%

 

[facility]

Referring Provider Line: [phone]

 

SITE ID: [id number]

## 2018-05-24 ENCOUNTER — HOSPITAL ENCOUNTER (EMERGENCY)
Dept: HOSPITAL 76 - ED | Age: 68
Discharge: HOME | End: 2018-05-24
Payer: MEDICARE

## 2018-05-24 ENCOUNTER — HOSPITAL ENCOUNTER (OUTPATIENT)
Dept: HOSPITAL 76 - LAB.R | Age: 68
Discharge: HOME | End: 2018-05-24
Attending: INTERNAL MEDICINE
Payer: MEDICARE

## 2018-05-24 VITALS — DIASTOLIC BLOOD PRESSURE: 43 MMHG | SYSTOLIC BLOOD PRESSURE: 123 MMHG

## 2018-05-24 DIAGNOSIS — A09: Primary | ICD-10-CM

## 2018-05-24 DIAGNOSIS — N18.6: ICD-10-CM

## 2018-05-24 DIAGNOSIS — Z99.2: ICD-10-CM

## 2018-05-24 DIAGNOSIS — E78.00: ICD-10-CM

## 2018-05-24 DIAGNOSIS — Z79.4: ICD-10-CM

## 2018-05-24 DIAGNOSIS — E87.5: Primary | ICD-10-CM

## 2018-05-24 DIAGNOSIS — A09: ICD-10-CM

## 2018-05-24 DIAGNOSIS — G47.30: ICD-10-CM

## 2018-05-24 DIAGNOSIS — E11.22: ICD-10-CM

## 2018-05-24 LAB
ALBUMIN DIAFP-MCNC: 2.5 G/DL (ref 3.2–5.5)
ALBUMIN/GLOB SERPL: 0.5 {RATIO} (ref 1–2.2)
ALP SERPL-CCNC: 126 IU/L (ref 42–121)
ALT SERPL W P-5'-P-CCNC: < 10 IU/L (ref 10–60)
ANION GAP SERPL CALCULATED.4IONS-SCNC: 13 MMOL/L (ref 6–13)
AST SERPL W P-5'-P-CCNC: 23 IU/L (ref 10–42)
BASOPHILS NFR BLD AUTO: 0.1 10^3/UL (ref 0–0.1)
BASOPHILS NFR BLD AUTO: 1.3 %
BILIRUB BLD-MCNC: 1.5 MG/DL (ref 0.2–1)
BUN SERPL-MCNC: 59 MG/DL (ref 6–20)
CALCIUM UR-MCNC: 9.4 MG/DL (ref 8.5–10.3)
CHLORIDE SERPL-SCNC: 92 MMOL/L (ref 101–111)
CO2 SERPL-SCNC: 27 MMOL/L (ref 21–32)
CREAT SERPLBLD-SCNC: 5.9 MG/DL (ref 0.4–1)
EOSINOPHIL # BLD AUTO: 0.1 10^3/UL (ref 0–0.7)
EOSINOPHIL NFR BLD AUTO: 0.6 %
ERYTHROCYTE [DISTWIDTH] IN BLOOD BY AUTOMATED COUNT: 24.8 % (ref 12–15)
GFRSERPLBLD MDRD-ARVRAT: 7 ML/MIN/{1.73_M2} (ref 89–?)
GLOBULIN SER-MCNC: 4.8 G/DL (ref 2.1–4.2)
GLUCOSE SERPL-MCNC: 131 MG/DL (ref 70–100)
HGB UR QL STRIP: 10.6 G/DL (ref 12–16)
LIPASE SERPL-CCNC: 37 U/L (ref 22–51)
LYMPHOCYTES # SPEC AUTO: 0.7 10^3/UL (ref 1.5–3.5)
LYMPHOCYTES NFR BLD AUTO: 9.1 %
MAGNESIUM SERPL-MCNC: 2.8 MG/DL (ref 1.7–2.8)
MCH RBC QN AUTO: 31.9 PG (ref 27–31)
MCHC RBC AUTO-ENTMCNC: 31.9 G/DL (ref 32–36)
MCV RBC AUTO: 99.9 FL (ref 81–99)
MONOCYTES # BLD AUTO: 0.5 10^3/UL (ref 0–1)
MONOCYTES NFR BLD AUTO: 6.2 %
NEUTROPHILS # BLD AUTO: 6.6 10^3/UL (ref 1.5–6.6)
NEUTROPHILS # SNV AUTO: 7.9 X10^3/UL (ref 4.8–10.8)
NEUTROPHILS NFR BLD AUTO: 82.8 %
PDW BLD AUTO: 7.5 FL (ref 7.9–10.8)
PHOSPHATE BLD-MCNC: 3.2 MG/DL (ref 2.5–4.6)
PLAT MORPH BLD: (no result)
PLATELET # BLD: 301 10^3/UL (ref 130–450)
PLATELET BLD QL SMEAR: (no result)
PROT SPEC-MCNC: 7.3 G/DL (ref 6.7–8.2)
RBC MAR: 3.32 10^6/UL (ref 4.2–5.4)
RBC MORPH BLD: (no result)
SODIUM SERPLBLD-SCNC: 132 MMOL/L (ref 135–145)

## 2018-05-24 PROCEDURE — 85025 COMPLETE CBC W/AUTO DIFF WBC: CPT

## 2018-05-24 PROCEDURE — 83690 ASSAY OF LIPASE: CPT

## 2018-05-24 PROCEDURE — 36415 COLL VENOUS BLD VENIPUNCTURE: CPT

## 2018-05-24 PROCEDURE — 87493 C DIFF AMPLIFIED PROBE: CPT

## 2018-05-24 PROCEDURE — 99282 EMERGENCY DEPT VISIT SF MDM: CPT

## 2018-05-24 PROCEDURE — 84100 ASSAY OF PHOSPHORUS: CPT

## 2018-05-24 PROCEDURE — 83735 ASSAY OF MAGNESIUM: CPT

## 2018-05-24 PROCEDURE — 80053 COMPREHEN METABOLIC PANEL: CPT

## 2018-05-24 NOTE — ED PHYSICIAN DOCUMENTATION
PD HPI NVD





- Stated complaint


Stated Complaint: LAB SHOWED HIGH POTASSIUM





- Chief complaint


Chief Complaint: General





- History obtained from


History obtained from: Patient





- History of Present Illness


Timing - onset: Yesterday (she had outpt lab yesterday and it showed high K 

level. Referred to ED for recheck. History of renal failure and does home 

dialysis. She says she is feeling okay.)


Associated symptoms: No: Fever, Abdominal pain, Dizzy, Loss of appetite


Similar symptoms before: Diagnosis (high potassium due to renal failure and 

illness in the past.)





Review of Systems


Constitutional: denies: Fever


Nose: reports: Rhinorrhea / runny nose.  denies: Congestion


Throat: denies: Sore throat


Respiratory: denies: Cough


GI: denies: Nausea, Vomiting, Diarrhea (but stool is soft from her meds.)


Skin: denies: Rash, Lesions





PD PAST MEDICAL HISTORY





- Past Medical History


Cardiovascular: High cholesterol


Respiratory: Sleep apnea, CPAP use


Endocrine/Autoimmune: Type 2 diabetes


GI: GI bleed, Other


: Dialysis, Other


HEENT: None


Psych: Depression, Anxiety


Musculoskeletal: Chronic back pain





- Past Surgical History


Past Surgical History: Yes





- Present Medications


Home Medications: 


 Ambulatory Orders











 Medication  Instructions  Recorded  Confirmed


 


Allopurinol 100 mg PO DAILY 04/12/15 03/17/18


 


Temazepam [Restoril] 7.5 mg PO DAILY PRN 04/12/15 03/17/18


 


buPROPion [Wellbutrin Xl] 300 mg PO DAILY 04/12/15 03/17/18


 


predniSONE [Deltasone] 5 mg PO DAILY 04/12/15 03/17/18


 


Citalopram Hydrobromide 40 mg PO DAILY 12/04/15 03/17/18





[Citalopram HBr]   


 


Sevelamer Carbonate [Renvela] 3 tab PO TID 12/04/15 03/17/18


 


Calcitriol [Rocaltrol] 0.5 mcg PO DAILY 08/16/16 03/17/18


 


Calcium Carbonate 1,500 mg PO DAILY 08/16/16 03/17/18


 


Cinacalcet HCl [Sensipar] 60 mg PO DAILY 08/16/16 03/17/18


 


Levothyroxine Sodium [Unithroid] 200 mcg PO DAILY 08/16/16 03/17/18


 


Omeprazole 40 mg PO DAILY 08/16/16 03/17/18


 


Rosuvastatin Calcium [Crestor] 20 mg PO DAILY 08/16/16 03/17/18


 


Gabapentin 200 mg 05/24/18 


 


HYDROmorphone [Dilaudid] 1 mg 05/24/18 


 


Sod Polystyrene Sulf. [Kayexalate]  05/24/18 














- Allergies


Allergies/Adverse Reactions: 


 Allergies











Allergy/AdvReac Type Severity Reaction Status Date / Time


 


adhesive tape Allergy  Rash Verified 03/08/18 09:37


 


promethazine HCl * AdvReac  Hallucinati Verified 03/08/18 09:37





[From Phenergan]   ons  


 


tegaderm Allergy Mild Rash Uncoded 05/24/18 12:26














- Social History


Does the pt smoke?: No


Smoking Status: Never smoker


Does the pt drink ETOH?: Yes


Does the pt have substance abuse?: No





- Immunizations


Immunizations are current?: Yes





- POLST


Patient has POLST: No


POLST Status: Full Code





PD ED PE NORMAL





- Vitals


Vital signs reviewed: Yes





- General


General: Alert and oriented X 3, No acute distress, Well developed/nourished





- Neck


Neck: Supple, no meningeal sign, No adenopathy





- Cardiac


Cardiac: RRR, No murmur





- Respiratory


Respiratory: Clear bilaterally





- Abdomen


Abdomen: Normal bowel sounds, Soft, Non tender, Non distended





- Back


Back: No CVA TTP





- Derm


Derm: Normal color, Warm and dry





- Extremities


Extremities: No deformity, No tenderness to palpate, No edema, No calf 

tenderness / cord





- Neuro


Neuro: Alert and oriented X 3, No motor deficit, Normal speech





Results





- Vitals


Vitals: 


 Vital Signs - 24 hr











  05/24/18





  12:18


 


Temperature 36.9 C


 


Heart Rate 92


 


Respiratory 17





Rate 


 


Blood Pressure 123/43 L


 


O2 Saturation 100








 Oxygen











O2 Source                      Room air

















- Labs


Labs: 


 Laboratory Tests











  05/24/18 05/24/18 05/24/18





  13:09 13:09 13:09


 


WBC   7.9 


 


RBC   3.32 L 


 


Hgb   10.6 L 


 


Hct   33.1 L 


 


MCV   99.9 H 


 


MCH   31.9 H 


 


MCHC   31.9 L 


 


RDW   24.8 H 


 


Plt Count   301 


 


MPV   7.5 L 


 


Neut #   6.6 


 


Lymph #   0.7 L 


 


Mono #   0.5 


 


Eos #   0.1 


 


Baso #   0.1 


 


Absolute Nucleated RBC   0.01 


 


Nucleated RBC %   0.1 


 


Manual Slide Review   Indicated 


 


Platelet Estimate   NORMAL (130-450,000) 


 


Platelet Morphology   NORMAL APPEARANCE 


 


RBC Morph Micro Appear   1+ MACROCYTOSIS 


 


Sodium  132 L  


 


Potassium  5.1 H  


 


Chloride  92 L  


 


Carbon Dioxide  27  


 


Anion Gap  13.0  


 


BUN  59 H  


 


Creatinine  5.9 H  


 


Estimated GFR (MDRD)  7 L  


 


Glucose  131 H  


 


Calcium  9.4  


 


Phosphorus    3.2


 


Magnesium    2.8


 


Total Bilirubin  1.5 H  


 


AST  23  


 


ALT  < 10 L  


 


Alkaline Phosphatase  126 H  


 


Total Protein  7.3  


 


Albumin  2.5 L  


 


Globulin  4.8 H  


 


Albumin/Globulin Ratio  0.5 L  


 


Lipase  37  














PD MEDICAL DECISION MAKING





- ED course


Complexity details: reviewed results (had had high K on outpt lab from 

yesterday. Lab here today is reasonable K level. ), considered differential, d/

w patient, d/w consultant (LIVIER Noriega)





Departure





- Departure


Disposition: 01 Home, Self Care


Clinical Impression: 


 Hyperkalemia, ESRD (end stage renal disease)





Condition: Stable


Record reviewed to determine appropriate education?: Yes


Follow-Up: 


Homer Borja MD [Primary Care Provider] - 


SHASHI Noriega MD [Provider Admit Priv/Credential] - 


Comments: 


Continue usual medications and do your dialysis today as scheduled.  Recheck if 

problems. I did update LIVIER Noriega so he is aware.


Discharge Date/Time: 05/24/18 15:06

## 2018-07-07 ENCOUNTER — HOSPITAL ENCOUNTER (OUTPATIENT)
Dept: HOSPITAL 76 - DI | Age: 68
Discharge: HOME | End: 2018-07-07
Attending: PHYSICIAN ASSISTANT
Payer: MEDICARE

## 2018-07-07 DIAGNOSIS — M46.46: Primary | ICD-10-CM

## 2018-07-07 PROCEDURE — 72100 X-RAY EXAM L-S SPINE 2/3 VWS: CPT

## 2018-07-07 PROCEDURE — 72131 CT LUMBAR SPINE W/O DYE: CPT

## 2018-07-07 NOTE — XRAY REPORT
Procedure Date:  07/07/2018   

Accession Number:  [id number] / [id number]                    

Procedure:  XR  - Lumbar Spine 2 View CPT Code:  

 

FULL RESULT:

 

 

EXAM: LUMBOSACRAL SPINE RADIOGRAPHY.

 

EXAM DATE: 07/07/2018 11:16 AM.

 

CLINICAL HISTORY: Diskitis of lumbar region.

 

COMPARISONS: lumbar spine 2 view 03/03/2018.

 

TECHNIQUE: 3 views.

 

FINDINGS:

Alignment: Stable mild levoscoliosis. Stable mild retrolisthesis at L3-L4 

and mild anterolisthesis at L4-L5.

 

Bones: Five non-rib-bearing lumbar vertebral bodies are present. No 

fractures or bone lesions.

 

Disks: New endplate irregularity with sclerosis and loss of disk space at 

L3-L4. Stable disk space narrowing at L4-L5 and L1-L2.

 

Sacroiliac Joints: Unremarkable.

 

Soft Tissues: Normal. The visualized bowel gas pattern is normal.

IMPRESSION: Endplate irregularity with endplate sclerosis and loss of 

disk space at L3-L4 compatible with diskitis.

 

[facility]

## 2018-07-08 NOTE — CT REPORT
Procedure Date:  07/07/2018   

Accession Number:  [id number] / [id number]                    

Procedure:  CT  - Lumbar Spine W/O CPT Code:  

 

FULL RESULT:

 

 

EXAM:

CT LUMBAR SPINE WITHOUT CONTRAST

 

EXAM DATE: 7/7/2018 11:32 AM.

 

CLINICAL HISTORY: DISCITIS OF LUMBAR REGION.

 

COMPARISONS: 07/07/2018 lumbar spine radiographs 10 05/19/2018 lumbar 

spine MRI. CT abdomen and pelvis 03/17/2018

 

TECHNIQUE: Thin-section axial images were acquired of the lumbar spine 

from T12 to S1 without contrast. Post-processing: Coronal and sagittal 

reformats. Other: None.

 

In accordance with CT protocol optimization, one or more of the following 

dose reduction techniques were utilized for this exam: automated exposure 

control, adjustment of mA and/or KV based on patient size, or use of 

iterative reconstructive technique.

 

FINDINGS:

Alignment: Retrolisthesis of L3 and L4 measures 3 mm. Minimal lumbar 

levoscoliosis measuring approximately 5 degrees is unchanged.

 

Bones: Five non-rib-bearing lumbar vertebral bodies are present. No 

fractures. No osseous lesions. When compared to the prior CT, there is 

increased irregularity of the inferior endplate of L3 and superior 

endplate of L4 with increasing sclerosis and progressive disk height 

loss, now moderate-severe. However, there is new vacuum disk phenomenon 

within the L3-L4 disk space. An intraosseous hemangioma within the right 

side of the L1 vertebral body is unchanged. Bones are otherwise normal.

 

Disk Levels/Facets:

T12-L1: Unremarkable.

 

L1-L2: Unremarkable.

 

L2-L3: Shallow broad-based disk bulge without spinal canal or foraminal 

stenosis.

 

L3-L4: Moderate-severe intervertebral disk height loss with sequelae of 

prior diskitis-osteomyelitis. Broad-based disk bulge and mild facet 

arthropathy with thickening of ligamentum flavum results in mild spinal 

canal and moderate bilateral foraminal stenosis.

 

L4-L5: A broad-based disk bulge and moderate facet arthropathy results in 

mild spinal canal and moderate bilateral foraminal stenosis, unchanged.

 

L5-S1: Shallow broad-based disk bulge and moderate facet arthropathy 

without spinal canal or foraminal stenosis.

 

Musculature: Normal. No fatty atrophy.

 

Other: The visualized retroperitoneum is unremarkable.

IMPRESSION:

1. Sequelae of prior diskitis-osteomyelitis at L3-L4, with increased 

irregularity and sclerosis at the inferior endplate of L3 and superior 

endplate of L4, but new vacuum disk phenomenon within the disk space that 

suggest resolving diskitis. No new areas of diskitis-osteomyelitis.

2. Unchanged multilevel lumbar spondylosis.

 

[facility]

## 2018-07-27 ENCOUNTER — HOSPITAL ENCOUNTER (OUTPATIENT)
Dept: HOSPITAL 76 - EMS | Age: 68
Discharge: ANOTHER HOSPITAL | End: 2018-07-27
Attending: SURGERY
Payer: MEDICARE

## 2018-07-27 ENCOUNTER — HOSPITAL ENCOUNTER (EMERGENCY)
Dept: HOSPITAL 76 - ED | Age: 68
Discharge: HOME | End: 2018-07-27
Payer: MEDICARE

## 2018-07-27 VITALS — SYSTOLIC BLOOD PRESSURE: 122 MMHG | DIASTOLIC BLOOD PRESSURE: 59 MMHG

## 2018-07-27 DIAGNOSIS — L29.9: ICD-10-CM

## 2018-07-27 DIAGNOSIS — E11.9: ICD-10-CM

## 2018-07-27 DIAGNOSIS — T36.1X5A: Primary | ICD-10-CM

## 2018-07-27 DIAGNOSIS — R06.02: Primary | ICD-10-CM

## 2018-07-27 DIAGNOSIS — I51.7: ICD-10-CM

## 2018-07-27 DIAGNOSIS — M54.9: ICD-10-CM

## 2018-07-27 DIAGNOSIS — Z99.2: ICD-10-CM

## 2018-07-27 LAB
ANION GAP SERPL CALCULATED.4IONS-SCNC: 14 MMOL/L (ref 6–13)
BASOPHILS NFR BLD AUTO: 0.1 10^3/UL (ref 0–0.1)
BASOPHILS NFR BLD AUTO: 1.5 %
BUN SERPL-MCNC: 34 MG/DL (ref 6–20)
CALCIUM UR-MCNC: 10.1 MG/DL (ref 8.5–10.3)
CHLORIDE SERPL-SCNC: 88 MMOL/L (ref 101–111)
CO2 SERPL-SCNC: 28 MMOL/L (ref 21–32)
CREAT SERPLBLD-SCNC: 4 MG/DL (ref 0.4–1)
EOSINOPHIL # BLD AUTO: 0.1 10^3/UL (ref 0–0.7)
EOSINOPHIL NFR BLD AUTO: 1.7 %
ERYTHROCYTE [DISTWIDTH] IN BLOOD BY AUTOMATED COUNT: 17.5 % (ref 12–15)
GFRSERPLBLD MDRD-ARVRAT: 11 ML/MIN/{1.73_M2} (ref 89–?)
GLUCOSE SERPL-MCNC: 147 MG/DL (ref 70–100)
HGB UR QL STRIP: 12.5 G/DL (ref 12–16)
LYMPHOCYTES # SPEC AUTO: 0.6 10^3/UL (ref 1.5–3.5)
LYMPHOCYTES NFR BLD AUTO: 9.6 %
MCH RBC QN AUTO: 28.6 PG (ref 27–31)
MCHC RBC AUTO-ENTMCNC: 31.2 G/DL (ref 32–36)
MCV RBC AUTO: 91.8 FL (ref 81–99)
MONOCYTES # BLD AUTO: 0.5 10^3/UL (ref 0–1)
MONOCYTES NFR BLD AUTO: 7.5 %
NEUTROPHILS # BLD AUTO: 5.1 10^3/UL (ref 1.5–6.6)
NEUTROPHILS # SNV AUTO: 6.4 X10^3/UL (ref 4.8–10.8)
NEUTROPHILS NFR BLD AUTO: 79.7 %
PDW BLD AUTO: 7.2 FL (ref 7.9–10.8)
PLATELET # BLD: 272 10^3/UL (ref 130–450)
RBC MAR: 4.35 10^6/UL (ref 4.2–5.4)
SODIUM SERPLBLD-SCNC: 130 MMOL/L (ref 135–145)

## 2018-07-27 PROCEDURE — 36415 COLL VENOUS BLD VENIPUNCTURE: CPT

## 2018-07-27 PROCEDURE — 80048 BASIC METABOLIC PNL TOTAL CA: CPT

## 2018-07-27 PROCEDURE — 71046 X-RAY EXAM CHEST 2 VIEWS: CPT

## 2018-07-27 PROCEDURE — 85025 COMPLETE CBC W/AUTO DIFF WBC: CPT

## 2018-07-27 PROCEDURE — 99283 EMERGENCY DEPT VISIT LOW MDM: CPT

## 2018-07-27 PROCEDURE — 93005 ELECTROCARDIOGRAM TRACING: CPT

## 2018-07-27 NOTE — XRAY REPORT
Procedure Date:  07/27/2018   

Accession Number:  [id number] / [id number]                    

Procedure:  XR  - Chest 2 View X-Ray CPT Code:  10809

 

FULL RESULT:

 

 

EXAM:

CHEST RADIOGRAPHY

 

EXAM DATE: 7/27/2018 06:21 PM.

 

CLINICAL HISTORY: Dyspnea.

 

COMPARISON: CHEST 1 VIEW 08/30/2017.

 

TECHNIQUE: 2 views.

 

FINDINGS:

Lungs/Pleura: No evidence of focal infiltrate. No effusion or 

pneumothorax.

 

Mediastinum: There is cardiomegaly.

 

Other: None.

IMPRESSION:

1. There is cardiomegaly.

2. No clear evidence of acute infiltrate or effusion. No pneumothorax.

 

[facility]

## 2018-07-27 NOTE — ED PHYSICIAN DOCUMENTATION
PD HPI DYSPNEA





- Stated complaint


Stated Complaint: SOA, BACK PAIN





- Chief complaint


Chief Complaint: Resp





- History obtained from


History obtained from: Patient, EMS





- History of Present Illness


Timing - onset: Other (She has been on Cefadroxil for about a month for what 

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  

She is followed by Dr. Lucina rossi [place] who is her infectious disease 

doctor.  She is also dialysis dependent. She has noted for the last month ever 

since starting medication she has had itching and she is actively itching 

herself what we talked as well as dyspnea and poor appetite, when she looked at 

the potential side effects for the new antibiotic all these were listed and she 

concerned that is what is causing it.)





Review of Systems


Ten Systems: 10 systems reviewed and negative


Constitutional: denies: Fever, Chills


Cardiac: denies: Chest pain / pressure, Palpitations


Respiratory: denies: Cough


GI: denies: Abdominal Pain





PD PAST MEDICAL HISTORY





- Past Medical History


Cardiovascular: High cholesterol


Respiratory: Sleep apnea, CPAP use


Endocrine/Autoimmune: Type 2 diabetes


GI: GI bleed, Other


: Dialysis, Other


HEENT: None


Psych: Depression, Anxiety


Musculoskeletal: Chronic back pain





- Past Surgical History


Past Surgical History: Yes





- Present Medications


Home Medications: 


 Ambulatory Orders











 Medication  Instructions  Recorded  Confirmed


 


buPROPion [Wellbutrin Xl] 300 mg PO DAILY 04/12/15 03/17/18


 


predniSONE [Deltasone] 5 mg PO DAILY 04/12/15 03/17/18


 


Citalopram Hydrobromide 40 mg PO DAILY 12/04/15 03/17/18





[Citalopram HBr]   


 


Sevelamer Carbonate [Renvela] 3 tab PO TID 12/04/15 03/17/18


 


Cinacalcet HCl [Sensipar] 60 mg PO DAILY 08/16/16 03/17/18


 


Levothyroxine Sodium [Unithroid] 200 mcg PO DAILY 08/16/16 03/17/18


 


Omeprazole 40 mg PO DAILY 08/16/16 03/17/18


 


Rosuvastatin Calcium [Crestor] 20 mg PO DAILY 08/16/16 03/17/18


 


HYDROmorphone [Dilaudid] 1 mg 05/24/18 


 


hydrOXYzine PAMOATE [Vistaril] 25 mg PO Q6H PRN #15 capsule 07/27/18 














- Allergies


Allergies/Adverse Reactions: 


 Allergies











Allergy/AdvReac Type Severity Reaction Status Date / Time


 


adhesive tape Allergy  Rash Verified 07/27/18 17:00


 


promethazine HCl * AdvReac  Hallucinati Verified 07/27/18 17:00





[From Phenergan]   ons  


 


tegaderm Allergy Mild Rash Uncoded 05/24/18 12:26














- Social History


Does the pt smoke?: No


Smoking Status: Never smoker


Does the pt drink ETOH?: Yes


Does the pt have substance abuse?: No





- Immunizations


Immunizations are current?: Yes





- POLST


Patient has POLST: No


POLST Status: Full Code





PD ED PE NORMAL





- Vitals


Vital signs reviewed: Yes





- General


General: Alert and oriented X 3, No acute distress





- Cardiac


Cardiac: RRR, No murmur





- Respiratory


Respiratory: No respiratory distress, Clear bilaterally





- Derm


Derm: Other (She has no rash but she is actively scratching her forearms.)





- Extremities


Extremities: No edema, No calf tenderness / cord





- Neuro


Neuro: Alert and oriented X 3, Normal speech





Results





- Vitals


Vitals: 


 Vital Signs - 24 hr











  07/27/18





  16:54


 


Temperature 36.8 C


 


Heart Rate 93


 


Respiratory 22





Rate 


 


Blood Pressure 105/41 L


 


O2 Saturation 100








 Oxygen











O2 Source                      Room air

















- EKG (time done)


  ** 1702


Rate: Rate (enter#) (91)


Rhythm: NSR


QRS: Low voltage


Ischemia: Non specific changes


Computer interpretation: Agree with computer





- Labs


Labs: 


 Laboratory Tests











  07/27/18 07/27/18





  17:50 17:50


 


WBC  6.4 


 


RBC  4.35 


 


Hgb  12.5 


 


Hct  40.0 


 


MCV  91.8 


 


MCH  28.6 


 


MCHC  31.2 L 


 


RDW  17.5 H 


 


Plt Count  272 


 


MPV  7.2 L 


 


Neut # (Auto)  5.1 


 


Lymph # (Auto)  0.6 L 


 


Mono # (Auto)  0.5 


 


Eos # (Auto)  0.1 


 


Baso # (Auto)  0.1 


 


Absolute Nucleated RBC  0.00 


 


Nucleated RBC %  0.0 


 


Sodium   130 L


 


Potassium   3.9


 


Chloride   88 L


 


Carbon Dioxide   28


 


Anion Gap   14.0 H


 


BUN   34 H


 


Creatinine   4.0 H


 


Estimated GFR (MDRD)   11 L


 


Glucose   147 H


 


Calcium   10.1














- Rads (name of study)


  ** 2v chest


Radiology: EMP read contemporaneously (cardiomegaly, NAD)





PD MEDICAL DECISION MAKING





- ED course


ED course: 





I spoke with Dr. Cox, infectious disease in [place] and discussed the case.  

He did not have ready access to her culture data, but given what we knew about 

her he presumes that she is on suppressive therapy and it is safe to stop it 

for the next 3 days and to have her call the clinic on Monday.





- Sepsis Event


Vital Signs: 


 Vital Signs - 24 hr











  07/27/18





  16:54


 


Temperature 36.8 C


 


Heart Rate 93


 


Respiratory 22





Rate 


 


Blood Pressure 105/41 L


 


O2 Saturation 100








 Oxygen











O2 Source                      Room air

















Departure





- Departure


Disposition: 01 Home, Self Care


Clinical Impression: 


 Antibiotic reaction





Condition: Good


Record reviewed to determine appropriate education?: Yes


Instructions:  ED Drug React Adverse Other


Prescriptions: 


hydrOXYzine PAMOATE [Vistaril] 25 mg PO Q6H PRN #15 capsule


 PRN Reason: Itching


Comments: 


Per the infectious disease physician Pedro, stop the antibiotic you are on 

for now and call Dr. Verdugo's office on Monday for replacement.

## 2018-08-18 ENCOUNTER — HOSPITAL ENCOUNTER (EMERGENCY)
Dept: HOSPITAL 76 - ED | Age: 68
Discharge: HOME | End: 2018-08-18
Payer: MEDICARE

## 2018-08-18 VITALS — DIASTOLIC BLOOD PRESSURE: 36 MMHG | SYSTOLIC BLOOD PRESSURE: 85 MMHG

## 2018-08-18 DIAGNOSIS — K62.89: Primary | ICD-10-CM

## 2018-08-18 DIAGNOSIS — E11.9: ICD-10-CM

## 2018-08-18 DIAGNOSIS — I10: ICD-10-CM

## 2018-08-18 LAB
ALBUMIN DIAFP-MCNC: 3.8 G/DL (ref 3.2–5.5)
ALBUMIN/GLOB SERPL: 1 {RATIO} (ref 1–2.2)
ALP SERPL-CCNC: 117 IU/L (ref 42–121)
ALT SERPL W P-5'-P-CCNC: 16 IU/L (ref 10–60)
ANION GAP SERPL CALCULATED.4IONS-SCNC: 13 MMOL/L (ref 6–13)
AST SERPL W P-5'-P-CCNC: 20 IU/L (ref 10–42)
BASOPHILS NFR BLD AUTO: 0.1 10^3/UL (ref 0–0.1)
BASOPHILS NFR BLD AUTO: 1.1 %
BILIRUB BLD-MCNC: 0.6 MG/DL (ref 0.2–1)
BUN SERPL-MCNC: 52 MG/DL (ref 6–20)
CALCIUM UR-MCNC: 10 MG/DL (ref 8.5–10.3)
CHLORIDE SERPL-SCNC: 92 MMOL/L (ref 101–111)
CO2 SERPL-SCNC: 25 MMOL/L (ref 21–32)
CREAT SERPLBLD-SCNC: 6 MG/DL (ref 0.4–1)
EOSINOPHIL # BLD AUTO: 0.1 10^3/UL (ref 0–0.7)
EOSINOPHIL NFR BLD AUTO: 1.4 %
ERYTHROCYTE [DISTWIDTH] IN BLOOD BY AUTOMATED COUNT: 17.2 % (ref 12–15)
GFRSERPLBLD MDRD-ARVRAT: 7 ML/MIN/{1.73_M2} (ref 89–?)
GLOBULIN SER-MCNC: 3.9 G/DL (ref 2.1–4.2)
GLUCOSE SERPL-MCNC: 153 MG/DL (ref 70–100)
HGB UR QL STRIP: 12.7 G/DL (ref 12–16)
LIPASE SERPL-CCNC: 46 U/L (ref 22–51)
LYMPHOCYTES # SPEC AUTO: 0.9 10^3/UL (ref 1.5–3.5)
LYMPHOCYTES NFR BLD AUTO: 11.4 %
MCH RBC QN AUTO: 28.6 PG (ref 27–31)
MCHC RBC AUTO-ENTMCNC: 34 G/DL (ref 32–36)
MCV RBC AUTO: 84.2 FL (ref 81–99)
MONOCYTES # BLD AUTO: 0.8 10^3/UL (ref 0–1)
MONOCYTES NFR BLD AUTO: 10 %
NEUTROPHILS # BLD AUTO: 5.8 10^3/UL (ref 1.5–6.6)
NEUTROPHILS # SNV AUTO: 7.6 X10^3/UL (ref 4.8–10.8)
NEUTROPHILS NFR BLD AUTO: 76.1 %
PDW BLD AUTO: 7.4 FL (ref 7.9–10.8)
PLATELET # BLD: 310 10^3/UL (ref 130–450)
PROT SPEC-MCNC: 7.7 G/DL (ref 6.7–8.2)
RBC MAR: 4.45 10^6/UL (ref 4.2–5.4)
SODIUM SERPLBLD-SCNC: 130 MMOL/L (ref 135–145)

## 2018-08-18 PROCEDURE — 72193 CT PELVIS W/DYE: CPT

## 2018-08-18 PROCEDURE — 99283 EMERGENCY DEPT VISIT LOW MDM: CPT

## 2018-08-18 PROCEDURE — 85025 COMPLETE CBC W/AUTO DIFF WBC: CPT

## 2018-08-18 PROCEDURE — 96374 THER/PROPH/DIAG INJ IV PUSH: CPT

## 2018-08-18 PROCEDURE — 36415 COLL VENOUS BLD VENIPUNCTURE: CPT

## 2018-08-18 PROCEDURE — 83690 ASSAY OF LIPASE: CPT

## 2018-08-18 PROCEDURE — 80053 COMPREHEN METABOLIC PANEL: CPT

## 2018-08-18 NOTE — CT REPORT
Procedure Date:  08/18/2018   

Accession Number:  [id number] / [id number]                    

Procedure:  CT  - Pelvis W/ CPT Code:  

 

FULL RESULT:

 

 

EXAM:

CT PELVIS

 

EXAM DATE: 8/18/2018 06:10 PM.

 

CLINICAL HISTORY: Rectal pain.

 

COMPARISONS: LUMBAR SPINE W/O 07/07/2018 11:22 AM

ABDOMEN/PELVIS W/O 03/17/2018 8:13 AM.

 

TECHNIQUE: Routine helical CT imaging was performed through the pelvis. 

IV contrast: ISOVUE 300  100mL. Enteric contrast: No. Reconstructions: 

Coronal and sagittal.

 

In accordance with CT protocol optimization, one or more of the following 

dose reduction techniques were utilized for this exam: automated exposure 

control, adjustment of mA and/or KV based on patient size, or use of 

iterative reconstructive technique.

 

FINDINGS:

Visualized Abdominal Organs: Normal.

 

Peritoneal Cavity/Bowel: There is a large right lower quadrant hernia 

defect measuring 9.6 cm in diameter. There is herniation of omentum, 

small bowel and colon through the large defect. This finding appears 

similar to the previous examination. There is no intraperitoneal free 

fluid or free air. Appendix appears within normal limits.

 

Pelvic Organs: The urinary bladder is empty. The uterus appears normal in 

size. Ovaries appear normal in size. There is evidence of pelvic floor 

prolapse. The anus is located approximately 3.8 cm inferior to the level 

of the inferior pubic symphysis.

 

Vasculature: No aneurysms or other significant abnormality.

 

Bones: No significant abnormality.

 

Other: None.

IMPRESSION:

1. Pelvic floor prolapse.

2. Large right lower quadrant ventral hernia defect with protrusion of 

omentum and bowel through the defect without bowel obstruction. Unchanged.

3. No abscess or mass.

 

[facility]

## 2018-08-18 NOTE — ED PHYSICIAN DOCUMENTATION
History of Present Illness





- Stated complaint


Stated Complaint: FEMALE 





- Chief complaint


Chief Complaint: Abd Pain





- History obtained from


History obtained from: Patient, Family





- History of Present Illness


Timing: How many days ago (several days)


Pain level max: 8


Pain level now: 7


Quality: aching, pain


Improved by: nothing


Worsened by: trying to have a BM





- Additonal information


Additional information: 





Patient is a 67-year-old female who performs home hemodialysis 5 days a week 

who presents with rectal pain. States similar to past hemorrhoids. No fevers. 

No chills.  States has noted some blood on the toilet paper recently and is 

concerned about potential bleeding.  Also states pain is worse than usual





Review of Systems


Constitutional: denies: Fever, Chills


Respiratory: denies: Cough


GI: denies: Abdominal Pain, Nausea, Vomiting, Diarrhea


: denies: Dysuria


Skin: denies: Rash


Musculoskeletal: denies: Neck pain, Back pain


Neurologic: denies: Headache





PD PAST MEDICAL HISTORY





- Past Medical History


Cardiovascular: Hypertension, High cholesterol


Respiratory: Pneumonia, Sleep apnea, CPAP use


Neuro: Peripheral neuropathy


Endocrine/Autoimmune: Type 2 diabetes, HyPOthyroidism


GI: GI bleed, Hiatal hernia, Hemorrhoids, Other


GYN: None


: Dialysis, Other


HEENT: None, Macular degeneration, Chronic hearing loss


Psych: Depression, Anxiety


Musculoskeletal: Chronic back pain


Derm: None





- Past Surgical History


Past Surgical History: Yes





- Present Medications


Home Medications: 


 Ambulatory Orders











 Medication  Instructions  Recorded  Confirmed


 


buPROPion [Wellbutrin Xl] 300 mg PO DAILY 04/12/15 03/17/18


 


predniSONE [Deltasone] 5 mg PO DAILY 04/12/15 03/17/18


 


Citalopram Hydrobromide 40 mg PO DAILY 12/04/15 03/17/18





[Citalopram HBr]   


 


Sevelamer Carbonate [Renvela] 3 tab PO TID 12/04/15 03/17/18


 


Cinacalcet HCl [Sensipar] 60 mg PO DAILY 08/16/16 03/17/18


 


Levothyroxine Sodium [Unithroid] 200 mcg PO DAILY 08/16/16 03/17/18


 


Omeprazole 40 mg PO DAILY 08/16/16 03/17/18


 


Rosuvastatin Calcium [Crestor] 20 mg PO DAILY 08/16/16 03/17/18


 


HYDROmorphone [Dilaudid] 1 mg 05/24/18 


 


hydrOXYzine PAMOATE [Vistaril] 25 mg PO Q6H PRN #15 capsule 07/27/18 


 


Lidocaine [Recticare] 1 applic TP Q6H PRN #1 bottle 08/18/18 














- Allergies


Allergies/Adverse Reactions: 


 Allergies











Allergy/AdvReac Type Severity Reaction Status Date / Time


 


adhesive tape Allergy  Rash Verified 08/18/18 16:27


 


promethazine HCl * AdvReac  Hallucinati Verified 08/18/18 16:27





[From Phenergan]   ons  


 


tegaderm Allergy Mild Rash Uncoded 08/18/18 16:27














- Social History


Does the pt smoke?: No


Smoking Status: Never smoker


Does the pt drink ETOH?: Yes


Does the pt have substance abuse?: No





- Immunizations


Immunizations are current?: Yes





- POLST


Patient has POLST: No


POLST Status: Full Code





PD ED PE NORMAL





- Vitals


Vital signs reviewed: Yes





- General


General: Alert and oriented X 3, No acute distress





- HEENT


HEENT: Moist mucous membranes





- Neck


Neck: Supple, no meningeal sign





- Cardiac


Cardiac: RRR





- Respiratory


Respiratory: No respiratory distress, Clear bilaterally





- Abdomen


Abdomen: Soft, Non tender, Non distended





- Female 


Female : Other (unable to tolerate rectal exam. normal external exam.)





- Derm


Derm: Warm and dry





- Neuro


Neuro: Alert and oriented X 3





- Psych


Psych: Normal mood, Normal affect





Results





- Vitals


Vitals: 


 Vital Signs - 24 hr











  08/18/18 08/18/18





  16:23 19:14


 


Temperature 36.6 C 37 C


 


Heart Rate 97 98


 


Respiratory 20 16





Rate  


 


Blood Pressure 96/33 L 85/36 L


 


O2 Saturation 96 95








 Oxygen











O2 Source                      Room air

















- Labs


Labs: 


 Laboratory Tests











  08/18/18 08/18/18 08/18/18





  17:06 17:06 17:25


 


WBC  7.6  


 


RBC  4.45  


 


Hgb  12.7  


 


Hct  37.5  


 


MCV  84.2  


 


MCH  28.6  


 


MCHC  34.0  


 


RDW  17.2 H  


 


Plt Count  310  


 


MPV  7.4 L  


 


Neut # (Auto)  5.8  


 


Lymph # (Auto)  0.9 L  


 


Mono # (Auto)  0.8  


 


Eos # (Auto)  0.1  


 


Baso # (Auto)  0.1  


 


Absolute Nucleated RBC  0.00  


 


Nucleated RBC %  0.0  


 


Sodium   130 L 


 


Potassium   4.8 


 


Chloride   92 L 


 


Carbon Dioxide   25 


 


Anion Gap   13.0 


 


BUN   52 H 


 


Creatinine   6.0 H 


 


Estimated GFR (MDRD)   7 L 


 


Glucose   153 H 


 


POC Whole Bld Glucose    152 H


 


Calcium   10.0 


 


Total Bilirubin   0.6 


 


AST   20 


 


ALT   16 


 


Alkaline Phosphatase   117 


 


Total Protein   7.7 


 


Albumin   3.8 


 


Globulin   3.9 


 


Albumin/Globulin Ratio   1.0 


 


Lipase   46 














- Rads (name of study)


  ** pelvis CT w/


Radiology: Prelim report reviewed, EMP read contemporaneously, See rad report (

Pelvic floor prolapse. Large right lower quadrant ventral hernia defect with 

protrusion of omentum and bowel through the defect without bowel obstruction. 

Unchanged.  No abscess or mass. )





PD MEDICAL DECISION MAKING





- ED course


Complexity details: reviewed results, re-evaluated patient, considered 

differential, d/w patient, d/w family


ED course: 





Patient is a 67-year-old female with rectal pain.  Unclear etiology.  Possible 

internal hemorrhoids but is unable to tolerate a rectal exam.  Pain controlled 

in the emergency department.  Will place on lidocaine ointment and follow-up 

closely with her doctor.  Concern for possible perirectal abscess, therefore CT 

scan was performed.  This is negative.  Patient and  comfortable going 

home at this time.  Patient counseled regarding signs and symptoms for which I 

believe and urgent re-evaluation would be necessary. Patient with good 

understanding of and agreement to plan.





This document was made in part using voice recognition software. While efforts 

are made to proofread this document, sound alike and grammatical errors may 

occur.





- Sepsis Event


Vital Signs: 


 Vital Signs - 24 hr











  08/18/18 08/18/18





  16:23 19:14


 


Temperature 36.6 C 37 C


 


Heart Rate 97 98


 


Respiratory 20 16





Rate  


 


Blood Pressure 96/33 L 85/36 L


 


O2 Saturation 96 95








 Oxygen











O2 Source                      Room air

















Departure





- Departure


Disposition: 01 Home, Self Care


Clinical Impression: 


 Rectal pain





Condition: Stable


Instructions:  ED Hemorrhoids


Follow-Up: 


Homer Borja MD [Primary Care Provider] - Within 1 week


Prescriptions: 


Lidocaine [Recticare] 1 applic TP Q6H PRN #1 bottle


 PRN Reason: hemorrhoids


Comments: 


Return if you worsen. Your labs and CT scan do not show any acute issues 

tonight. 


Discharge Date/Time: 08/18/18 19:37

## 2018-08-20 ENCOUNTER — HOSPITAL ENCOUNTER (EMERGENCY)
Dept: HOSPITAL 76 - ED | Age: 68
Discharge: HOME | End: 2018-08-20
Payer: MEDICARE

## 2018-08-20 ENCOUNTER — HOSPITAL ENCOUNTER (OUTPATIENT)
Dept: HOSPITAL 76 - EMS | Age: 68
Discharge: ANOTHER HOSPITAL | End: 2018-08-20
Attending: SURGERY
Payer: MEDICARE

## 2018-08-20 VITALS — SYSTOLIC BLOOD PRESSURE: 89 MMHG | DIASTOLIC BLOOD PRESSURE: 41 MMHG

## 2018-08-20 DIAGNOSIS — E11.9: ICD-10-CM

## 2018-08-20 DIAGNOSIS — F32.9: Primary | ICD-10-CM

## 2018-08-20 DIAGNOSIS — R45.851: Primary | ICD-10-CM

## 2018-08-20 DIAGNOSIS — Z79.4: ICD-10-CM

## 2018-08-20 DIAGNOSIS — I10: ICD-10-CM

## 2018-08-20 LAB
ALBUMIN DIAFP-MCNC: 3.8 G/DL (ref 3.2–5.5)
ALBUMIN/GLOB SERPL: 1 {RATIO} (ref 1–2.2)
ALP SERPL-CCNC: 111 IU/L (ref 42–121)
ALT SERPL W P-5'-P-CCNC: 14 IU/L (ref 10–60)
ANION GAP SERPL CALCULATED.4IONS-SCNC: 16 MMOL/L (ref 6–13)
APAP SERPL-MCNC: < 10 UG/ML (ref 10–30)
AST SERPL W P-5'-P-CCNC: 21 IU/L (ref 10–42)
BASOPHILS NFR BLD AUTO: 0.1 10^3/UL (ref 0–0.1)
BASOPHILS NFR BLD AUTO: 1.1 %
BILIRUB BLD-MCNC: 0.8 MG/DL (ref 0.2–1)
BUN SERPL-MCNC: 48 MG/DL (ref 6–20)
CALCIUM UR-MCNC: 9.6 MG/DL (ref 8.5–10.3)
CHLORIDE SERPL-SCNC: 89 MMOL/L (ref 101–111)
CO2 SERPL-SCNC: 23 MMOL/L (ref 21–32)
CREAT SERPLBLD-SCNC: 5.8 MG/DL (ref 0.4–1)
EOSINOPHIL # BLD AUTO: 0.1 10^3/UL (ref 0–0.7)
EOSINOPHIL NFR BLD AUTO: 1.5 %
ERYTHROCYTE [DISTWIDTH] IN BLOOD BY AUTOMATED COUNT: 16.9 % (ref 12–15)
GFRSERPLBLD MDRD-ARVRAT: 7 ML/MIN/{1.73_M2} (ref 89–?)
GLOBULIN SER-MCNC: 4 G/DL (ref 2.1–4.2)
GLUCOSE SERPL-MCNC: 136 MG/DL (ref 70–100)
HGB UR QL STRIP: 12.4 G/DL (ref 12–16)
LIPASE SERPL-CCNC: 38 U/L (ref 22–51)
LYMPHOCYTES # SPEC AUTO: 0.8 10^3/UL (ref 1.5–3.5)
LYMPHOCYTES NFR BLD AUTO: 12.6 %
MCH RBC QN AUTO: 28 PG (ref 27–31)
MCHC RBC AUTO-ENTMCNC: 33 G/DL (ref 32–36)
MCV RBC AUTO: 84.9 FL (ref 81–99)
MONOCYTES # BLD AUTO: 0.6 10^3/UL (ref 0–1)
MONOCYTES NFR BLD AUTO: 8.4 %
NEUTROPHILS # BLD AUTO: 5.1 10^3/UL (ref 1.5–6.6)
NEUTROPHILS # SNV AUTO: 6.7 X10^3/UL (ref 4.8–10.8)
NEUTROPHILS NFR BLD AUTO: 76.4 %
PDW BLD AUTO: 7.5 FL (ref 7.9–10.8)
PLATELET # BLD: 275 10^3/UL (ref 130–450)
PROT SPEC-MCNC: 7.8 G/DL (ref 6.7–8.2)
RBC MAR: 4.44 10^6/UL (ref 4.2–5.4)
SALICYLATES SERPL-MCNC: < 6 MG/DL
SODIUM SERPLBLD-SCNC: 128 MMOL/L (ref 135–145)

## 2018-08-20 PROCEDURE — 80329 ANALGESICS NON-OPIOID 1 OR 2: CPT

## 2018-08-20 PROCEDURE — 83690 ASSAY OF LIPASE: CPT

## 2018-08-20 PROCEDURE — 84443 ASSAY THYROID STIM HORMONE: CPT

## 2018-08-20 PROCEDURE — 85025 COMPLETE CBC W/AUTO DIFF WBC: CPT

## 2018-08-20 PROCEDURE — 80053 COMPREHEN METABOLIC PANEL: CPT

## 2018-08-20 PROCEDURE — 99283 EMERGENCY DEPT VISIT LOW MDM: CPT

## 2018-08-20 PROCEDURE — 99284 EMERGENCY DEPT VISIT MOD MDM: CPT

## 2018-08-20 PROCEDURE — 36415 COLL VENOUS BLD VENIPUNCTURE: CPT

## 2018-08-20 PROCEDURE — 80320 DRUG SCREEN QUANTALCOHOLS: CPT

## 2018-08-20 PROCEDURE — 96374 THER/PROPH/DIAG INJ IV PUSH: CPT

## 2018-08-20 PROCEDURE — 80307 DRUG TEST PRSMV CHEM ANLYZR: CPT

## 2018-08-20 NOTE — ED PHYSICIAN DOCUMENTATION
PD HPI MHE





- Stated complaint


Stated Complaint: MHE





- Chief complaint


Chief Complaint: MHE





- History obtained from


History obtained from: Patient, EMS





- History of Present Illness


Primary symptom: Suicidal ideation


Timing - onset: Chronic (worse today)


Pain level max: 10


Pain level now: 10


Contributing factors: Other (states tired of being chronically ill)


Similar symptoms before: Diagnosis (depression)





- Additional information


Additional information: 





states used to see a counselor, but not anymore. No psychiatrist. Was going to 

OD on insulin, but could not find her medications. Still feels suicidal. 





Review of Systems


Constitutional: denies: Fever, Chills


Ears: denies: Ear pain


Nose: denies: Rhinorrhea / runny nose, Congestion


Cardiac: denies: Chest pain / pressure


Respiratory: denies: Cough


GI: denies: Abdominal Pain, Nausea, Vomiting, Diarrhea


Skin: denies: Rash


Musculoskeletal: reports: Back pain (chronic).  denies: Neck pain


Neurologic: denies: Focal weakness, Numbness, Headache





PD PAST MEDICAL HISTORY





- Past Medical History


Cardiovascular: Hypertension, High cholesterol


Respiratory: Pneumonia, Sleep apnea, CPAP use


Neuro: Peripheral neuropathy


Endocrine/Autoimmune: Type 2 diabetes, HyPOthyroidism


GI: GI bleed, Hiatal hernia, Hemorrhoids, Other


GYN: None


: Dialysis, Other


HEENT: None, Macular degeneration, Chronic hearing loss


Psych: Depression, Anxiety


Musculoskeletal: Chronic back pain


Derm: None





- Past Surgical History


Past Surgical History: Yes





- Present Medications


Home Medications: 


 Ambulatory Orders











 Medication  Instructions  Recorded  Confirmed


 


buPROPion [Wellbutrin Xl] 300 mg PO DAILY 04/12/15 03/17/18


 


predniSONE [Deltasone] 5 mg PO DAILY 04/12/15 03/17/18


 


Citalopram Hydrobromide 40 mg PO DAILY 12/04/15 03/17/18





[Citalopram HBr]   


 


Sevelamer Carbonate [Renvela] 3 tab PO TID 12/04/15 03/17/18


 


Cinacalcet HCl [Sensipar] 60 mg PO DAILY 08/16/16 03/17/18


 


Levothyroxine Sodium [Unithroid] 200 mcg PO DAILY 08/16/16 03/17/18


 


Omeprazole 40 mg PO DAILY 08/16/16 03/17/18


 


Rosuvastatin Calcium [Crestor] 20 mg PO DAILY 08/16/16 03/17/18


 


HYDROmorphone [Dilaudid] 1 mg 05/24/18 


 


hydrOXYzine PAMOATE [Vistaril] 25 mg PO Q6H PRN #15 capsule 07/27/18 


 


Lidocaine [Recticare] 1 applic TP Q6H PRN #1 bottle 08/18/18 














- Allergies


Allergies/Adverse Reactions: 


 Allergies











Allergy/AdvReac Type Severity Reaction Status Date / Time


 


adhesive tape Allergy  Rash Verified 08/20/18 13:50


 


promethazine HCl * AdvReac  Hallucinati Verified 08/20/18 13:50





[From Phenergan]   ons  


 


tegaderm Allergy Mild Rash Uncoded 08/20/18 13:50














- Social History


Does the pt smoke?: No


Smoking Status: Never smoker


Does the pt drink ETOH?: Yes


Does the pt have substance abuse?: No





- Immunizations


Immunizations are current?: Yes





- POLST


Patient has POLST: No


POLST Status: Full Code





PD ED PE NORMAL





- Vitals


Vital signs reviewed: Yes





- General


General: Alert and oriented X 3, No acute distress





- HEENT


HEENT: Moist mucous membranes





- Neck


Neck: Supple, no meningeal sign





- Cardiac


Cardiac: RRR





- Respiratory


Respiratory: No respiratory distress, Clear bilaterally





Results





- Vitals


Vitals: 


 Vital Signs - 24 hr











  08/20/18 08/20/18





  13:45 17:25


 


Temperature 35.7 C L 36.0 C L


 


Heart Rate 93 94


 


Respiratory 16 20





Rate  


 


Blood Pressure 95/44 L 89/41 L


 


O2 Saturation 99 93








 Oxygen











O2 Source                      Room air

















- Labs


Labs: 


 Laboratory Tests











  08/20/18 08/20/18 08/20/18





  14:13 14:13 14:13


 


WBC  6.7  


 


RBC  4.44  


 


Hgb  12.4  


 


Hct  37.6  


 


MCV  84.9  


 


MCH  28.0  


 


MCHC  33.0  


 


RDW  16.9 H  


 


Plt Count  275  


 


MPV  7.5 L  


 


Neut # (Auto)  5.1  


 


Lymph # (Auto)  0.8 L  


 


Mono # (Auto)  0.6  


 


Eos # (Auto)  0.1  


 


Baso # (Auto)  0.1  


 


Absolute Nucleated RBC  0.00  


 


Nucleated RBC %  0.0  


 


Sodium   128 L 


 


Potassium   4.7 


 


Chloride   89 L 


 


Carbon Dioxide   23 


 


Anion Gap   16.0 H 


 


BUN   48 H 


 


Creatinine   5.8 H 


 


Estimated GFR (MDRD)   7 L 


 


Glucose   136 H 


 


Calcium   9.6 


 


Total Bilirubin   0.8 


 


AST   21 


 


ALT   14 


 


Alkaline Phosphatase   111 


 


Total Protein   7.8 


 


Albumin   3.8 


 


Globulin   4.0 


 


Albumin/Globulin Ratio   1.0 


 


Lipase   38 


 


TSH    3.13


 


Salicylates   < 6.0 


 


Acetaminophen   < 10 L 


 


Ethyl Alcohol   < 5.0 














PD MEDICAL DECISION MAKING





- ED course


Complexity details: reviewed old records, reviewed results, re-evaluated patient

, considered differential, d/w patient, d/w family, d/w consultant


ED course: 





Patient is a 67-year-old female with suicidal ideation today.  She improved 

throughout her emergency department stay.  Social work was contacted and a plan 

was made for mental health to check on her tomorrow.  Her sister is coming into 

[facility] and the patient feels that she can keep herself safe at home with 

her sister.  Her  is also on board with the plan.  Patient states she 

feels much better after ketamine.  Patient counseled regarding signs and 

symptoms for which I believe and urgent re-evaluation would be necessary. 

Patient with good understanding of and agreement to plan and is comfortable 

going home at this time





This document was made in part using voice recognition software. While efforts 

are made to proofread this document, sound alike and grammatical errors may 

occur.





- Sepsis Event


Vital Signs: 


 Vital Signs - 24 hr











  08/20/18 08/20/18





  13:45 17:25


 


Temperature 35.7 C L 36.0 C L


 


Heart Rate 93 94


 


Respiratory 16 20





Rate  


 


Blood Pressure 95/44 L 89/41 L


 


O2 Saturation 99 93








 Oxygen











O2 Source                      Room air

















Departure





- Departure


Disposition: 01 Home, Self Care


Clinical Impression: 


Depression


Qualifiers:


 Depression Type: unspecified Qualified Code(s): F32.9 - Major depressive 

disorder, single episode, unspecified





Condition: Good


Instructions:  ED Depression


Follow-Up: 


Homer Borja MD [Primary Care Provider] - Within 3 Days


Comments: 


Your sister will stay with you [facility]. The community outreach team will speak 

with you tomorrow. Return if you worsen.


Crisis Line [phone] and is available 24/7/365 to talk to someone





[email or web address] is also available 24/7/365 to chat with someone online 

if you prefer. There are also many resources on this website and apps for your 

phone to help with your mental health





You can also text the word START to [phone] to chat with someome via text. 


Discharge Date/Time: 08/20/18 18:20